# Patient Record
Sex: MALE | Race: WHITE | Employment: FULL TIME | URBAN - METROPOLITAN AREA
[De-identification: names, ages, dates, MRNs, and addresses within clinical notes are randomized per-mention and may not be internally consistent; named-entity substitution may affect disease eponyms.]

---

## 2017-05-04 RX ORDER — FENOFIBRATE 145 MG/1
TABLET, COATED ORAL
Qty: 90 TABLET | Refills: 0 | Status: SHIPPED | OUTPATIENT
Start: 2017-05-04 | End: 2017-07-31

## 2017-05-04 RX ORDER — TRIAMTERENE AND HYDROCHLOROTHIAZIDE 37.5; 25 MG/1; MG/1
CAPSULE ORAL
Qty: 90 CAPSULE | Refills: 0 | Status: SHIPPED | OUTPATIENT
Start: 2017-05-04 | End: 2017-07-31

## 2017-05-04 RX ORDER — AMLODIPINE BESYLATE 5 MG/1
TABLET ORAL
Qty: 90 TABLET | Refills: 0 | Status: SHIPPED | OUTPATIENT
Start: 2017-05-04 | End: 2017-07-31

## 2017-05-04 RX ORDER — SIMVASTATIN 20 MG
TABLET ORAL
Qty: 90 TABLET | Refills: 0 | Status: SHIPPED | OUTPATIENT
Start: 2017-05-04 | End: 2017-07-31

## 2017-08-02 RX ORDER — AMLODIPINE BESYLATE 5 MG/1
TABLET ORAL
Qty: 90 TABLET | Refills: 0 | Status: SHIPPED | OUTPATIENT
Start: 2017-08-02 | End: 2018-01-20

## 2017-08-02 RX ORDER — FENOFIBRATE 145 MG/1
TABLET, COATED ORAL
Qty: 90 TABLET | Refills: 0 | Status: SHIPPED | OUTPATIENT
Start: 2017-08-02 | End: 2018-01-20

## 2017-08-02 RX ORDER — SIMVASTATIN 20 MG
TABLET ORAL
Qty: 90 TABLET | Refills: 0 | Status: SHIPPED | OUTPATIENT
Start: 2017-08-02 | End: 2018-01-20

## 2017-08-02 RX ORDER — TRIAMTERENE AND HYDROCHLOROTHIAZIDE 37.5; 25 MG/1; MG/1
CAPSULE ORAL
Qty: 90 CAPSULE | Refills: 0 | Status: SHIPPED | OUTPATIENT
Start: 2017-08-02 | End: 2018-01-20

## 2017-08-02 NOTE — TELEPHONE ENCOUNTER
Gudelia-JACKI calling to check status of refills     [facility] Setting states Pt is out of medication.

## 2018-01-05 NOTE — LETTER
April 30, 2019     Nicola Grover  [address]  [place] [address]      Dear Jose Borja:    Below are the results from your recent visit:    Resulted Orders   COMP METABOLIC PANEL (14)   Result Value Ref Range    Glucose 95 70 - 99 mg/dL    Sodium Dr. Gongora  It looks like Tonia says the paperwork for this was on your desk yesterday.  Just wondering if this has been sent?   Lymphocyte Absolute 1.54 1.00 - 4.00 x10(3) uL    Monocyte Absolute 0.57 0.10 - 1.00 x10(3) uL    Eosinophil Absolute 0.11 0.00 - 0.70 x10(3) uL    Basophil Absolute 0.04 0.00 - 0.20 x10(3) uL    Immature Granulocyte Absolute 0.02 0.00 - 1.00 x10(3) uL

## 2018-01-19 NOTE — TELEPHONE ENCOUNTER
Pt calling to request refill for the following medication. Pt out of medication pt leaving out of town on sunday      Current Outpatient Prescriptions:  SIMVASTATIN 20 MG Oral Tab TAKE 1 TABLET BY MOUTH NIGHTLY.  Disp: 90 tablet Rfl: 0   FENOFIBRATE 145 MG

## 2018-01-20 NOTE — TELEPHONE ENCOUNTER
Failed protocol no recent labs.      Cholesterol Medications  Protocol Criteria:  · Appointment scheduled in the past 12 months or in the next 3 months  · ALT & LDL on file in the past 12 months  · ALT result < 80  · LDL result <130   Recent Outpatient Visi

## 2018-01-21 RX ORDER — FENOFIBRATE 145 MG/1
145 TABLET, COATED ORAL
Qty: 90 TABLET | Refills: 1 | Status: SHIPPED | OUTPATIENT
Start: 2018-01-21 | End: 2018-08-06

## 2018-01-21 RX ORDER — AMLODIPINE BESYLATE 5 MG/1
5 TABLET ORAL
Qty: 90 TABLET | Refills: 1 | Status: SHIPPED | OUTPATIENT
Start: 2018-01-21 | End: 2018-08-06

## 2018-01-21 RX ORDER — SIMVASTATIN 20 MG
TABLET ORAL
Qty: 90 TABLET | Refills: 1 | Status: SHIPPED | OUTPATIENT
Start: 2018-01-21 | End: 2018-08-06

## 2018-01-21 RX ORDER — TRIAMTERENE AND HYDROCHLOROTHIAZIDE 37.5; 25 MG/1; MG/1
CAPSULE ORAL
Qty: 90 CAPSULE | Refills: 1 | Status: SHIPPED | OUTPATIENT
Start: 2018-01-21 | End: 2018-08-06

## 2018-08-06 RX ORDER — TRIAMTERENE AND HYDROCHLOROTHIAZIDE 37.5; 25 MG/1; MG/1
CAPSULE ORAL
Qty: 30 CAPSULE | Refills: 1 | Status: SHIPPED | OUTPATIENT
Start: 2018-08-06 | End: 2018-11-23

## 2018-08-06 RX ORDER — SIMVASTATIN 20 MG
TABLET ORAL
Qty: 30 TABLET | Refills: 1 | Status: SHIPPED | OUTPATIENT
Start: 2018-08-06 | End: 2018-11-23

## 2018-08-06 RX ORDER — AMLODIPINE BESYLATE 5 MG/1
5 TABLET ORAL
Qty: 30 TABLET | Refills: 1 | Status: SHIPPED | OUTPATIENT
Start: 2018-08-06 | End: 2018-11-23

## 2018-08-06 RX ORDER — FENOFIBRATE 145 MG/1
145 TABLET, COATED ORAL
Qty: 30 TABLET | Refills: 1 | Status: SHIPPED | OUTPATIENT
Start: 2018-08-06 | End: 2018-11-23

## 2018-08-06 NOTE — TELEPHONE ENCOUNTER
Please call the patient. We are not giving him the requested amount. He has not been here in 2 years. I am only going to give him enough for 1 month with one additional refill. He must get in to see me before he is due for more refills after that.

## 2018-08-06 NOTE — TELEPHONE ENCOUNTER
Protocol failed due to [address] 2 years ago, labs from 2015. Please advise.     Please reply to pool: EM RN TRIAGE    Hypertensive Medications  Protocol Criteria:  · Appointment scheduled in the past 6 months or in the next 3 months  · BMP or CMP in the pas

## 2018-08-09 NOTE — TELEPHONE ENCOUNTER
Left message for patient to call office back, please transfer call to ext. [phone] when patient calls back.

## 2018-08-13 NOTE — TELEPHONE ENCOUNTER
Left message for patient to call office back, please transfer call to ext. [phone] when patient calls back.

## 2018-08-14 NOTE — TELEPHONE ENCOUNTER
CSS=-please call patient and assists with OV for med refill. Will also mail no phone response today. No future appointments.

## 2018-08-15 NOTE — TELEPHONE ENCOUNTER
Unable to leave message for patient voicemail full,several attempts to reach patient by phone, No response letter mailed to patients home address listed in EMR. Also informed pharmacy that no additional refill till patient is seen in office.

## 2018-09-06 NOTE — TELEPHONE ENCOUNTER
Requesting Amlodipine and Simvastatin refills, noted on 8/6/18 refills pt needs to schedule appt for further refills. LMTCB, please assist pt in scheduling appt when he returns the call.

## 2018-09-11 RX ORDER — SIMVASTATIN 20 MG
TABLET ORAL
Qty: 30 TABLET | Refills: 1 | OUTPATIENT
Start: 2018-09-11

## 2018-09-11 RX ORDER — AMLODIPINE BESYLATE 5 MG/1
5 TABLET ORAL
Qty: 30 TABLET | Refills: 1 | OUTPATIENT
Start: 2018-09-11

## 2018-11-25 RX ORDER — TRIAMTERENE AND HYDROCHLOROTHIAZIDE 37.5; 25 MG/1; MG/1
CAPSULE ORAL
Qty: 90 CAPSULE | Refills: 0 | Status: SHIPPED | OUTPATIENT
Start: 2018-11-25 | End: 2019-03-24

## 2018-11-25 RX ORDER — FENOFIBRATE 145 MG/1
145 TABLET, COATED ORAL
Qty: 90 TABLET | Refills: 0 | Status: SHIPPED | OUTPATIENT
Start: 2018-11-25 | End: 2019-03-24

## 2018-11-25 RX ORDER — AMLODIPINE BESYLATE 5 MG/1
5 TABLET ORAL
Qty: 90 TABLET | Refills: 0 | Status: SHIPPED | OUTPATIENT
Start: 2018-11-25 | End: 2019-03-24

## 2018-11-25 RX ORDER — SIMVASTATIN 20 MG
TABLET ORAL
Qty: 90 TABLET | Refills: 0 | Status: SHIPPED | OUTPATIENT
Start: 2018-11-25 | End: 2019-03-24

## 2019-03-26 RX ORDER — FENOFIBRATE 145 MG/1
145 TABLET, COATED ORAL
Qty: 20 TABLET | Refills: 0 | Status: SHIPPED | OUTPATIENT
Start: 2019-03-26 | End: 2019-04-25

## 2019-03-26 RX ORDER — SIMVASTATIN 20 MG
TABLET ORAL
Qty: 20 TABLET | Refills: 0 | Status: SHIPPED | OUTPATIENT
Start: 2019-03-26 | End: 2019-04-25

## 2019-03-26 RX ORDER — TRIAMTERENE AND HYDROCHLOROTHIAZIDE 37.5; 25 MG/1; MG/1
CAPSULE ORAL
Qty: 20 CAPSULE | Refills: 0 | Status: SHIPPED | OUTPATIENT
Start: 2019-03-26 | End: 2019-04-25

## 2019-03-26 RX ORDER — AMLODIPINE BESYLATE 5 MG/1
TABLET ORAL
Qty: 20 TABLET | Refills: 0 | Status: SHIPPED | OUTPATIENT
Start: 2019-03-26 | End: 2019-04-25

## 2019-03-26 NOTE — TELEPHONE ENCOUNTER
Pt is long overdue for refills. He has been advised in past of need for follow up. I have given a refill for a greatly reduced number of pills. No further refills until appt made.

## 2019-04-25 ENCOUNTER — OFFICE VISIT (OUTPATIENT)
Dept: INTERNAL MEDICINE CLINIC | Facility: CLINIC | Age: 54
End: 2019-04-25
Payer: COMMERCIAL

## 2019-04-25 VITALS
HEART RATE: 80 BPM | DIASTOLIC BLOOD PRESSURE: 98 MMHG | WEIGHT: 282.63 LBS | TEMPERATURE: 98 F | RESPIRATION RATE: 20 BRPM | SYSTOLIC BLOOD PRESSURE: 164 MMHG | BODY MASS INDEX: 44.36 KG/M2 | HEIGHT: 67 IN

## 2019-04-25 DIAGNOSIS — E78.5 HYPERLIPIDEMIA, UNSPECIFIED HYPERLIPIDEMIA TYPE: ICD-10-CM

## 2019-04-25 DIAGNOSIS — Z12.5 SCREENING FOR PROSTATE CANCER: ICD-10-CM

## 2019-04-25 DIAGNOSIS — I10 ESSENTIAL HYPERTENSION: Primary | ICD-10-CM

## 2019-04-25 DIAGNOSIS — E66.01 CLASS 3 SEVERE OBESITY WITH BODY MASS INDEX (BMI) OF 40.0 TO 44.9 IN ADULT, UNSPECIFIED OBESITY TYPE, UNSPECIFIED WHETHER SERIOUS COMORBIDITY PRESENT (HCC): ICD-10-CM

## 2019-04-25 DIAGNOSIS — Z12.11 COLON CANCER SCREENING: ICD-10-CM

## 2019-04-25 PROCEDURE — 99212 OFFICE O/P EST SF 10 MIN: CPT | Performed by: INTERNAL MEDICINE

## 2019-04-25 PROCEDURE — 99214 OFFICE O/P EST MOD 30 MIN: CPT | Performed by: INTERNAL MEDICINE

## 2019-04-25 RX ORDER — FENOFIBRATE 145 MG/1
145 TABLET, COATED ORAL DAILY
Qty: 90 TABLET | Refills: 1 | Status: SHIPPED | OUTPATIENT
Start: 2019-04-25 | End: 2019-10-29

## 2019-04-25 RX ORDER — TRIAMTERENE AND HYDROCHLOROTHIAZIDE 37.5; 25 MG/1; MG/1
CAPSULE ORAL
Qty: 90 CAPSULE | Refills: 1 | Status: SHIPPED | OUTPATIENT
Start: 2019-04-25 | End: 2019-10-29

## 2019-04-25 RX ORDER — AMLODIPINE BESYLATE 5 MG/1
5 TABLET ORAL
Qty: 90 TABLET | Refills: 1 | Status: SHIPPED | OUTPATIENT
Start: 2019-04-25 | End: 2019-10-29

## 2019-04-25 RX ORDER — SIMVASTATIN 20 MG
TABLET ORAL
Qty: 90 TABLET | Refills: 1 | Status: SHIPPED | OUTPATIENT
Start: 2019-04-25 | End: 2019-10-29

## 2019-04-25 NOTE — PROGRESS NOTES
HPI:    Patient ID: Teresa Nieto is a 48year old male. HPI  Patient is here for follow-up on chronic medical issues as listed below. I last saw him in the office in August 2016. Has not seen any other doctor since that time.   Getting refills th for rash. Neurological: Negative for weakness, numbness and headaches. Hematological: Does not bruise/bleed easily. Psychiatric/Behavioral: Negative for depressed mood. The patient is not nervous/anxious.              Current Outpatient Medications: Advised to follow-up in 2 to 3 months to recheck blood pressure. Work on diet and exercise. Check blood work and contact patient with results. Lab work last done for 5 years ago. - CBC WITH DIFFERENTIAL WITH PLATELET;  Future  - COMP METABOLIC PANEL (14

## 2019-04-26 ENCOUNTER — LAB ENCOUNTER (OUTPATIENT)
Dept: LAB | Facility: HOSPITAL | Age: 54
End: 2019-04-26
Attending: INTERNAL MEDICINE
Payer: COMMERCIAL

## 2019-04-26 DIAGNOSIS — Z12.5 SCREENING FOR PROSTATE CANCER: ICD-10-CM

## 2019-04-26 DIAGNOSIS — I10 ESSENTIAL HYPERTENSION: ICD-10-CM

## 2019-04-26 PROCEDURE — 84443 ASSAY THYROID STIM HORMONE: CPT

## 2019-04-26 PROCEDURE — 80061 LIPID PANEL: CPT

## 2019-04-26 PROCEDURE — 80053 COMPREHEN METABOLIC PANEL: CPT

## 2019-04-26 PROCEDURE — 36415 COLL VENOUS BLD VENIPUNCTURE: CPT

## 2019-04-26 PROCEDURE — 83036 HEMOGLOBIN GLYCOSYLATED A1C: CPT

## 2019-04-26 PROCEDURE — 85025 COMPLETE CBC W/AUTO DIFF WBC: CPT

## 2019-10-31 RX ORDER — TRIAMTERENE AND HYDROCHLOROTHIAZIDE 37.5; 25 MG/1; MG/1
CAPSULE ORAL
Qty: 90 CAPSULE | Refills: 0 | Status: SHIPPED | OUTPATIENT
Start: 2019-10-31 | End: 2020-01-31

## 2019-10-31 RX ORDER — AMLODIPINE BESYLATE 5 MG/1
TABLET ORAL
Qty: 90 TABLET | Refills: 0 | Status: SHIPPED | OUTPATIENT
Start: 2019-10-31 | End: 2020-01-31

## 2019-11-01 RX ORDER — SIMVASTATIN 20 MG
TABLET ORAL
Qty: 90 TABLET | Refills: 1 | Status: SHIPPED | OUTPATIENT
Start: 2019-11-01 | End: 2020-05-31

## 2019-11-01 RX ORDER — FENOFIBRATE 145 MG/1
TABLET, COATED ORAL
Qty: 90 TABLET | Refills: 1 | Status: SHIPPED | OUTPATIENT
Start: 2019-11-01 | End: 2020-05-31

## 2019-11-01 NOTE — TELEPHONE ENCOUNTER
Please review, protocol failed. Due for 6 month follow up appointment. Three must refill provided and will need appt before next refill is due.      Requested Prescriptions     Pending Prescriptions Disp Refills   • AMLODIPINE BESYLATE 5 MG Oral Tab [Pha 04/26/2019    GFRAA 114 04/26/2019    CA 9.3 04/26/2019    ALKPHOS 53 01/29/2015    AST 47 (H) 04/26/2019    ALT 88 (H) 04/26/2019    BILT 0.4 04/26/2019    TP 7.2 04/26/2019    ALB 3.9 04/26/2019     04/26/2019    K 3.9 04/26/2019     04/26/20

## 2019-11-01 NOTE — TELEPHONE ENCOUNTER
Please review, protocol failed. Last refills 4/25/19 for both fenofibrate 145mg daily and simvastatin 20 mg daily. Last office visit 4/25/19  Needs new lipid panel.     Requested Prescriptions     Pending Prescriptions Disp Refills   • FENOFIBRATE 145 MG  (H) 04/26/2019     Lab Results   Component Value Date    ALT 88 (H) 04/26/2019

## 2019-11-01 NOTE — TELEPHONE ENCOUNTER
Please review, protocol failed. Due for follow up appointment with MD.  Three months refill provided and will require appt with MD before next refill is due.     Requested Prescriptions     Pending Prescriptions Disp Refills   • Triamterene-HCTZ 37.5-25 MG CREATSERUM 0.86 04/26/2019    BUNCREA 24.4 (H) 04/26/2019    GFRNAA 99 04/26/2019    GFRAA 114 04/26/2019    CA 9.3 04/26/2019    ALKPHOS 53 01/29/2015    AST 47 (H) 04/26/2019    ALT 88 (H) 04/26/2019    BILT 0.4 04/26/2019    TP 7.2 04/26/2019    ALB 3.9

## 2020-01-31 RX ORDER — AMLODIPINE BESYLATE 5 MG/1
TABLET ORAL
Qty: 90 TABLET | Refills: 0 | Status: SHIPPED | OUTPATIENT
Start: 2020-01-31 | End: 2020-07-06

## 2020-01-31 RX ORDER — TRIAMTERENE AND HYDROCHLOROTHIAZIDE 37.5; 25 MG/1; MG/1
CAPSULE ORAL
Qty: 90 CAPSULE | Refills: 0 | Status: SHIPPED | OUTPATIENT
Start: 2020-01-31 | End: 2020-05-11

## 2020-05-11 RX ORDER — TRIAMTERENE AND HYDROCHLOROTHIAZIDE 37.5; 25 MG/1; MG/1
1 CAPSULE ORAL EVERY MORNING
Qty: 90 CAPSULE | Refills: 0 | Status: SHIPPED | OUTPATIENT
Start: 2020-05-11 | End: 2020-08-09

## 2020-05-11 NOTE — TELEPHONE ENCOUNTER
This is being sent to you without review by the Triage staff due to the high call volumes created by the COVID-19 virus, per the email sent by Dr. Nelson Or on 3/19/20. Thank you for your support.     Centralized Nurse Triage Team

## 2020-05-11 NOTE — TELEPHONE ENCOUNTER
•  TRIAMTERENE-HCTZ 37.5-25 MG Oral Cap, TAKE 1 CAPSULE BY MOUTH EVERY DAY IN THE MORNING, Disp: 90 capsule, Rfl: 0

## 2020-05-31 RX ORDER — SIMVASTATIN 20 MG
TABLET ORAL
Qty: 90 TABLET | Refills: 1 | Status: SHIPPED | OUTPATIENT
Start: 2020-05-31 | End: 2020-12-20

## 2020-05-31 RX ORDER — FENOFIBRATE 145 MG/1
TABLET, COATED ORAL
Qty: 90 TABLET | Refills: 1 | Status: SHIPPED | OUTPATIENT
Start: 2020-05-31 | End: 2020-10-20

## 2020-07-06 RX ORDER — AMLODIPINE BESYLATE 5 MG/1
TABLET ORAL
Qty: 90 TABLET | Refills: 0 | Status: SHIPPED | OUTPATIENT
Start: 2020-07-06 | End: 2020-10-03

## 2020-08-09 RX ORDER — TRIAMTERENE AND HYDROCHLOROTHIAZIDE 37.5; 25 MG/1; MG/1
CAPSULE ORAL
Qty: 90 CAPSULE | Refills: 0 | Status: SHIPPED | OUTPATIENT
Start: 2020-08-09 | End: 2020-11-09

## 2020-09-03 ENCOUNTER — TELEPHONE (OUTPATIENT)
Dept: INTERNAL MEDICINE CLINIC | Facility: CLINIC | Age: 55
End: 2020-09-03

## 2020-09-03 NOTE — TELEPHONE ENCOUNTER
LMTCB. Please assist patient on scheduling appt with Dr. Guillermo Irvin.  Pt is due for Annual physical.

## 2020-10-03 RX ORDER — AMLODIPINE BESYLATE 5 MG/1
TABLET ORAL
Qty: 90 TABLET | Refills: 0 | Status: SHIPPED | OUTPATIENT
Start: 2020-10-03 | End: 2021-01-13

## 2020-10-03 NOTE — TELEPHONE ENCOUNTER
Please schedule a phone visit, video visit or in person visit.     AURA Jennings with Dr. Mayi Westfall

## 2020-10-20 ENCOUNTER — VIRTUAL PHONE E/M (OUTPATIENT)
Dept: INTERNAL MEDICINE CLINIC | Facility: CLINIC | Age: 55
End: 2020-10-20
Payer: COMMERCIAL

## 2020-10-20 DIAGNOSIS — Z00.00 ANNUAL PHYSICAL EXAM: ICD-10-CM

## 2020-10-20 DIAGNOSIS — I10 ESSENTIAL HYPERTENSION: ICD-10-CM

## 2020-10-20 DIAGNOSIS — E78.5 HYPERLIPIDEMIA, UNSPECIFIED HYPERLIPIDEMIA TYPE: ICD-10-CM

## 2020-10-20 DIAGNOSIS — Z12.11 ENCOUNTER FOR SCREENING COLONOSCOPY: Primary | ICD-10-CM

## 2020-10-20 PROCEDURE — 99213 OFFICE O/P EST LOW 20 MIN: CPT | Performed by: NURSE PRACTITIONER

## 2020-10-20 RX ORDER — FENOFIBRATE 145 MG/1
145 TABLET, COATED ORAL DAILY
Qty: 90 TABLET | Refills: 1 | Status: SHIPPED | OUTPATIENT
Start: 2020-10-20 | End: 2021-07-12

## 2020-10-20 NOTE — ASSESSMENT & PLAN NOTE
A/P patient with a history of hyperlipidemia last readings were pretty good and listed below. These were reviewed with the patient.     Cholesterol, Total   <200 mg/dL 185    Comment:    Desirable  <200 mg/dL   Borderline  200-239 mg/dL   High      >=240 m

## 2020-10-20 NOTE — ASSESSMENT & PLAN NOTE
A/P 42-year-old male who has a history of hypertension and is on amlodipine 5 mg daily. He also takes tramadol during hydrochlorothiazide 1 Every day.     Plan  1) Follow a low-salt diet  2) Annual lab  3) May want to get a blood pressure machine at home t

## 2020-10-20 NOTE — PROGRESS NOTES
HPI:    Patient ID: Chente Buckley is a 54year old male. Please note that the following visit was completed using two-way, real-time interactive audio and/or video communication.   This has been done in good [place] to provide continuity of care in the daily.    Hyperlipidemia  A/P patient with a history of hyperlipidemia last readings were pretty good and listed below. These were reviewed with the patient.     Cholesterol, Total   <200 mg/dL 185    Comment:    Desirable  <200 mg/dL   Borderline  200-239 Concerns:        Caffeine Concern: No         Review of Systems   Constitutional: Negative for chills, fatigue and fever. HENT: Negative for ear pain, hearing loss, sinus pressure and sore throat. Eyes: Negative for visual disturbance.    Respiratory: This Visit        Cardiovascular    Essential hypertension     A/P 54-year-old male who has a history of hypertension and is on amlodipine 5 mg daily. He also takes tramadol during hydrochlorothiazide 1 Every day.     Plan  1) Follow a low-salt diet  2) An Visit:  Requested Prescriptions     Signed Prescriptions Disp Refills   • Fenofibrate 145 MG Oral Tab 90 tablet 1     Sig: Take 1 tablet (145 mg total) by mouth daily.        Imaging & Referrals:  EVALUATE & TREAT, GASTRO (INTERNAL)       Video visit 12 min

## 2020-11-09 RX ORDER — TRIAMTERENE AND HYDROCHLOROTHIAZIDE 37.5; 25 MG/1; MG/1
CAPSULE ORAL
Qty: 90 CAPSULE | Refills: 0 | Status: SHIPPED | OUTPATIENT
Start: 2020-11-09 | End: 2021-02-18

## 2020-11-09 NOTE — TELEPHONE ENCOUNTER
Remind patient that he needs to get his blood work done.     AURA Chairez  Working wit Dr. Chip Huston

## 2020-12-05 ENCOUNTER — NURSE TRIAGE (OUTPATIENT)
Dept: INTERNAL MEDICINE CLINIC | Facility: CLINIC | Age: 55
End: 2020-12-05

## 2020-12-05 NOTE — TELEPHONE ENCOUNTER
Action Requested: Summary for Provider     []  Critical Lab, Recommendations Needed  [] Need Additional Advice  []   FYI    []   Need Orders  [] Need Medications Sent to Pharmacy  []  Other     SUMMARY: Per protocol advised home care with mostly fluids and

## 2020-12-08 ENCOUNTER — LAB ENCOUNTER (OUTPATIENT)
Dept: LAB | Facility: HOSPITAL | Age: 55
End: 2020-12-08
Attending: NURSE PRACTITIONER
Payer: COMMERCIAL

## 2020-12-08 DIAGNOSIS — I10 ESSENTIAL HYPERTENSION: ICD-10-CM

## 2020-12-08 DIAGNOSIS — E78.5 HYPERLIPIDEMIA, UNSPECIFIED HYPERLIPIDEMIA TYPE: ICD-10-CM

## 2020-12-08 PROCEDURE — 86900 BLOOD TYPING SEROLOGIC ABO: CPT

## 2020-12-08 PROCEDURE — 86901 BLOOD TYPING SEROLOGIC RH(D): CPT

## 2020-12-08 PROCEDURE — 36415 COLL VENOUS BLD VENIPUNCTURE: CPT

## 2020-12-08 PROCEDURE — 80061 LIPID PANEL: CPT

## 2020-12-08 PROCEDURE — 80053 COMPREHEN METABOLIC PANEL: CPT

## 2020-12-08 PROCEDURE — 83036 HEMOGLOBIN GLYCOSYLATED A1C: CPT

## 2020-12-08 PROCEDURE — 82306 VITAMIN D 25 HYDROXY: CPT

## 2020-12-08 PROCEDURE — 82607 VITAMIN B-12: CPT

## 2020-12-08 PROCEDURE — 85025 COMPLETE CBC W/AUTO DIFF WBC: CPT

## 2020-12-08 PROCEDURE — 84443 ASSAY THYROID STIM HORMONE: CPT

## 2020-12-08 PROCEDURE — 84439 ASSAY OF FREE THYROXINE: CPT

## 2020-12-10 RX ORDER — ERGOCALCIFEROL 1.25 MG/1
50000 CAPSULE ORAL WEEKLY
Qty: 12 CAPSULE | Refills: 0 | Status: SHIPPED | OUTPATIENT
Start: 2020-12-10 | End: 2020-12-22

## 2020-12-20 RX ORDER — SIMVASTATIN 20 MG
TABLET ORAL
Qty: 90 TABLET | Refills: 1 | Status: ON HOLD | OUTPATIENT
Start: 2020-12-20 | End: 2021-03-22

## 2021-01-13 RX ORDER — AMLODIPINE BESYLATE 5 MG/1
TABLET ORAL
Qty: 90 TABLET | Refills: 0 | Status: SHIPPED | OUTPATIENT
Start: 2021-01-13 | End: 2021-04-12

## 2021-02-18 RX ORDER — TRIAMTERENE AND HYDROCHLOROTHIAZIDE 37.5; 25 MG/1; MG/1
CAPSULE ORAL
Qty: 90 CAPSULE | Refills: 0 | Status: ON HOLD | OUTPATIENT
Start: 2021-02-18 | End: 2021-03-22

## 2021-03-10 RX ORDER — ERGOCALCIFEROL 1.25 MG/1
50000 CAPSULE ORAL WEEKLY
Qty: 12 CAPSULE | Refills: 0 | OUTPATIENT
Start: 2021-03-10 | End: 2021-03-22

## 2021-03-19 ENCOUNTER — HOSPITAL ENCOUNTER (EMERGENCY)
Facility: HOSPITAL | Age: 56
Discharge: HOME | End: 2021-03-19
Attending: EMERGENCY MEDICINE
Payer: COMMERCIAL

## 2021-03-19 VITALS
HEART RATE: 71 BPM | SYSTOLIC BLOOD PRESSURE: 135 MMHG | DIASTOLIC BLOOD PRESSURE: 80 MMHG | RESPIRATION RATE: 20 BRPM | OXYGEN SATURATION: 93 % | TEMPERATURE: 98 F

## 2021-03-19 DIAGNOSIS — K29.00 ACUTE GASTRITIS WITHOUT HEMORRHAGE, UNSPECIFIED GASTRITIS TYPE: Primary | ICD-10-CM

## 2021-03-19 LAB
ALBUMIN SERPL-MCNC: 4.3 G/DL (ref 3.4–5)
ALP LIVER SERPL-CCNC: 82 U/L
ALT SERPL-CCNC: 71 U/L
ANION GAP SERPL CALC-SCNC: 3 MMOL/L (ref 0–18)
AST SERPL-CCNC: 42 U/L (ref 15–37)
BASOPHILS # BLD AUTO: 0.04 X10(3) UL (ref 0–0.2)
BASOPHILS NFR BLD AUTO: 0.3 %
BILIRUB DIRECT SERPL-MCNC: 0.1 MG/DL (ref 0–0.2)
BILIRUB SERPL-MCNC: 0.5 MG/DL (ref 0.1–2)
BUN BLD-MCNC: 23 MG/DL (ref 7–18)
BUN/CREAT SERPL: 24.2 (ref 10–20)
CALCIUM BLD-MCNC: 9.5 MG/DL (ref 8.5–10.1)
CHLORIDE SERPL-SCNC: 105 MMOL/L (ref 98–112)
CO2 SERPL-SCNC: 31 MMOL/L (ref 21–32)
CREAT BLD-MCNC: 0.95 MG/DL
DEPRECATED RDW RBC AUTO: 38.8 FL (ref 35.1–46.3)
EOSINOPHIL # BLD AUTO: 0.03 X10(3) UL (ref 0–0.7)
EOSINOPHIL NFR BLD AUTO: 0.2 %
ERYTHROCYTE [DISTWIDTH] IN BLOOD BY AUTOMATED COUNT: 12.4 % (ref 11–15)
GLUCOSE BLD-MCNC: 141 MG/DL (ref 70–99)
HCT VFR BLD AUTO: 44 %
HGB BLD-MCNC: 15 G/DL
IMM GRANULOCYTES # BLD AUTO: 0.04 X10(3) UL (ref 0–1)
IMM GRANULOCYTES NFR BLD: 0.3 %
LIPASE SERPL-CCNC: 141 U/L (ref 73–393)
LYMPHOCYTES # BLD AUTO: 1.18 X10(3) UL (ref 1–4)
LYMPHOCYTES NFR BLD AUTO: 9.6 %
M PROTEIN MFR SERPL ELPH: 7.8 G/DL (ref 6.4–8.2)
MCH RBC QN AUTO: 29.5 PG (ref 26–34)
MCHC RBC AUTO-ENTMCNC: 34.1 G/DL (ref 31–37)
MCV RBC AUTO: 86.6 FL
MONOCYTES # BLD AUTO: 0.63 X10(3) UL (ref 0.1–1)
MONOCYTES NFR BLD AUTO: 5.1 %
NEUTROPHILS # BLD AUTO: 10.41 X10 (3) UL (ref 1.5–7.7)
NEUTROPHILS # BLD AUTO: 10.41 X10(3) UL (ref 1.5–7.7)
NEUTROPHILS NFR BLD AUTO: 84.5 %
OSMOLALITY SERPL CALC.SUM OF ELEC: 294 MOSM/KG (ref 275–295)
PLATELET # BLD AUTO: 267 10(3)UL (ref 150–450)
POTASSIUM SERPL-SCNC: 3.8 MMOL/L (ref 3.5–5.1)
RBC # BLD AUTO: 5.08 X10(6)UL
SODIUM SERPL-SCNC: 139 MMOL/L (ref 136–145)
WBC # BLD AUTO: 12.3 X10(3) UL (ref 4–11)

## 2021-03-19 PROCEDURE — 99284 EMERGENCY DEPT VISIT MOD MDM: CPT

## 2021-03-19 PROCEDURE — 96375 TX/PRO/DX INJ NEW DRUG ADDON: CPT

## 2021-03-19 PROCEDURE — 96361 HYDRATE IV INFUSION ADD-ON: CPT

## 2021-03-19 PROCEDURE — 83690 ASSAY OF LIPASE: CPT | Performed by: EMERGENCY MEDICINE

## 2021-03-19 PROCEDURE — 96374 THER/PROPH/DIAG INJ IV PUSH: CPT

## 2021-03-19 PROCEDURE — 85025 COMPLETE CBC W/AUTO DIFF WBC: CPT | Performed by: EMERGENCY MEDICINE

## 2021-03-19 PROCEDURE — 80048 BASIC METABOLIC PNL TOTAL CA: CPT | Performed by: EMERGENCY MEDICINE

## 2021-03-19 PROCEDURE — 80076 HEPATIC FUNCTION PANEL: CPT | Performed by: EMERGENCY MEDICINE

## 2021-03-19 RX ORDER — MAGNESIUM HYDROXIDE/ALUMINUM HYDROXICE/SIMETHICONE 120; 1200; 1200 MG/30ML; MG/30ML; MG/30ML
10 SUSPENSION ORAL 4 TIMES DAILY PRN
Qty: 1 BOTTLE | Refills: 0 | Status: SHIPPED | OUTPATIENT
Start: 2021-03-19 | End: 2021-04-12 | Stop reason: ALTCHOICE

## 2021-03-19 RX ORDER — ONDANSETRON 2 MG/ML
4 INJECTION INTRAMUSCULAR; INTRAVENOUS ONCE
Status: COMPLETED | OUTPATIENT
Start: 2021-03-19 | End: 2021-03-19

## 2021-03-19 RX ORDER — METOCLOPRAMIDE HYDROCHLORIDE 5 MG/ML
10 INJECTION INTRAMUSCULAR; INTRAVENOUS ONCE
Status: COMPLETED | OUTPATIENT
Start: 2021-03-19 | End: 2021-03-19

## 2021-03-19 RX ORDER — ONDANSETRON 4 MG/1
4 TABLET, ORALLY DISINTEGRATING ORAL EVERY 4 HOURS PRN
Qty: 10 TABLET | Refills: 0 | Status: SHIPPED | OUTPATIENT
Start: 2021-03-19 | End: 2021-03-26

## 2021-03-20 ENCOUNTER — HOSPITAL ENCOUNTER (INPATIENT)
Facility: HOSPITAL | Age: 56
LOS: 1 days | Discharge: HOME | DRG: 418 | End: 2021-03-22
Attending: EMERGENCY MEDICINE | Admitting: HOSPITALIST
Payer: COMMERCIAL

## 2021-03-20 ENCOUNTER — APPOINTMENT (OUTPATIENT)
Dept: ULTRASOUND IMAGING | Facility: HOSPITAL | Age: 56
DRG: 418 | End: 2021-03-20
Attending: EMERGENCY MEDICINE
Payer: COMMERCIAL

## 2021-03-20 DIAGNOSIS — K81.0 ACUTE CHOLECYSTITIS: ICD-10-CM

## 2021-03-20 DIAGNOSIS — K81.9 CHOLECYSTITIS: Primary | ICD-10-CM

## 2021-03-20 LAB
ALBUMIN SERPL-MCNC: 3.7 G/DL (ref 3.4–5)
ALP LIVER SERPL-CCNC: 61 U/L
ALT SERPL-CCNC: 56 U/L
ANION GAP SERPL CALC-SCNC: 7 MMOL/L (ref 0–18)
AST SERPL-CCNC: 26 U/L (ref 15–37)
BASOPHILS # BLD AUTO: 0.03 X10(3) UL (ref 0–0.2)
BASOPHILS NFR BLD AUTO: 0.2 %
BILIRUB DIRECT SERPL-MCNC: 0.5 MG/DL (ref 0–0.2)
BILIRUB SERPL-MCNC: 1.3 MG/DL (ref 0.1–2)
BILIRUB UR QL: NEGATIVE
BUN BLD-MCNC: 20 MG/DL (ref 7–18)
BUN/CREAT SERPL: 20.8 (ref 10–20)
CALCIUM BLD-MCNC: 8.8 MG/DL (ref 8.5–10.1)
CHLORIDE SERPL-SCNC: 103 MMOL/L (ref 98–112)
CO2 SERPL-SCNC: 28 MMOL/L (ref 21–32)
COLOR UR: YELLOW
CREAT BLD-MCNC: 0.96 MG/DL
DEPRECATED RDW RBC AUTO: 40.5 FL (ref 35.1–46.3)
EOSINOPHIL # BLD AUTO: 0 X10(3) UL (ref 0–0.7)
EOSINOPHIL NFR BLD AUTO: 0 %
ERYTHROCYTE [DISTWIDTH] IN BLOOD BY AUTOMATED COUNT: 12.6 % (ref 11–15)
GLUCOSE BLD-MCNC: 137 MG/DL (ref 70–99)
GLUCOSE UR-MCNC: 50 MG/DL
HCT VFR BLD AUTO: 43.5 %
HGB BLD-MCNC: 14.4 G/DL
HGB UR QL STRIP.AUTO: NEGATIVE
IMM GRANULOCYTES # BLD AUTO: 0.14 X10(3) UL (ref 0–1)
IMM GRANULOCYTES NFR BLD: 0.9 %
KETONES UR-MCNC: 20 MG/DL
LEUKOCYTE ESTERASE UR QL STRIP.AUTO: NEGATIVE
LIPASE SERPL-CCNC: 69 U/L (ref 73–393)
LYMPHOCYTES # BLD AUTO: 0.66 X10(3) UL (ref 1–4)
LYMPHOCYTES NFR BLD AUTO: 4.1 %
M PROTEIN MFR SERPL ELPH: 7.5 G/DL (ref 6.4–8.2)
MCH RBC QN AUTO: 29.2 PG (ref 26–34)
MCHC RBC AUTO-ENTMCNC: 33.1 G/DL (ref 31–37)
MCV RBC AUTO: 88.2 FL
MONOCYTES # BLD AUTO: 1.42 X10(3) UL (ref 0.1–1)
MONOCYTES NFR BLD AUTO: 8.8 %
NEUTROPHILS # BLD AUTO: 13.89 X10 (3) UL (ref 1.5–7.7)
NEUTROPHILS # BLD AUTO: 13.89 X10(3) UL (ref 1.5–7.7)
NEUTROPHILS NFR BLD AUTO: 86 %
NITRITE UR QL STRIP.AUTO: NEGATIVE
OSMOLALITY SERPL CALC.SUM OF ELEC: 291 MOSM/KG (ref 275–295)
PH UR: 5 [PH] (ref 5–8)
PLATELET # BLD AUTO: 217 10(3)UL (ref 150–450)
POTASSIUM SERPL-SCNC: 3.6 MMOL/L (ref 3.5–5.1)
PROT UR-MCNC: 30 MG/DL
RBC # BLD AUTO: 4.93 X10(6)UL
SODIUM SERPL-SCNC: 138 MMOL/L (ref 136–145)
SP GR UR STRIP: >1.03 (ref 1–1.03)
UROBILINOGEN UR STRIP-ACNC: 4
WBC # BLD AUTO: 16.1 X10(3) UL (ref 4–11)

## 2021-03-20 PROCEDURE — 76705 ECHO EXAM OF ABDOMEN: CPT | Performed by: EMERGENCY MEDICINE

## 2021-03-20 RX ORDER — KETOROLAC TROMETHAMINE 15 MG/ML
15 INJECTION, SOLUTION INTRAMUSCULAR; INTRAVENOUS ONCE
Status: COMPLETED | OUTPATIENT
Start: 2021-03-20 | End: 2021-03-20

## 2021-03-20 RX ORDER — ONDANSETRON 2 MG/ML
4 INJECTION INTRAMUSCULAR; INTRAVENOUS ONCE
Status: COMPLETED | OUTPATIENT
Start: 2021-03-20 | End: 2021-03-20

## 2021-03-20 NOTE — ED NOTES
Patient is here with nausea and 2 episodes of emesis. No blood in emesis. Patient denied diarrhea. Patient feels that he might have food poisoning because that's how he felt when he had it last time. Denied pain with palpation.

## 2021-03-21 ENCOUNTER — APPOINTMENT (OUTPATIENT)
Dept: GENERAL RADIOLOGY | Facility: HOSPITAL | Age: 56
DRG: 418 | End: 2021-03-21
Attending: HOSPITALIST
Payer: COMMERCIAL

## 2021-03-21 ENCOUNTER — ANESTHESIA (OUTPATIENT)
Dept: SURGERY | Facility: HOSPITAL | Age: 56
DRG: 418 | End: 2021-03-21
Payer: COMMERCIAL

## 2021-03-21 ENCOUNTER — ANESTHESIA EVENT (OUTPATIENT)
Dept: SURGERY | Facility: HOSPITAL | Age: 56
DRG: 418 | End: 2021-03-21
Payer: COMMERCIAL

## 2021-03-21 LAB
ALBUMIN SERPL-MCNC: 3.3 G/DL (ref 3.4–5)
ALBUMIN/GLOB SERPL: 0.9 {RATIO} (ref 1–2)
ALP LIVER SERPL-CCNC: 87 U/L
ALT SERPL-CCNC: 69 U/L
ANION GAP SERPL CALC-SCNC: 3 MMOL/L (ref 0–18)
AST SERPL-CCNC: 48 U/L (ref 15–37)
BASOPHILS # BLD AUTO: 0.03 X10(3) UL (ref 0–0.2)
BASOPHILS NFR BLD AUTO: 0.2 %
BILIRUB SERPL-MCNC: 1.5 MG/DL (ref 0.1–2)
BUN BLD-MCNC: 18 MG/DL (ref 7–18)
BUN/CREAT SERPL: 22.2 (ref 10–20)
CALCIUM BLD-MCNC: 8.6 MG/DL (ref 8.5–10.1)
CHLORIDE SERPL-SCNC: 106 MMOL/L (ref 98–112)
CO2 SERPL-SCNC: 31 MMOL/L (ref 21–32)
CREAT BLD-MCNC: 0.81 MG/DL
DEPRECATED RDW RBC AUTO: 41.1 FL (ref 35.1–46.3)
EOSINOPHIL # BLD AUTO: 0 X10(3) UL (ref 0–0.7)
EOSINOPHIL NFR BLD AUTO: 0 %
ERYTHROCYTE [DISTWIDTH] IN BLOOD BY AUTOMATED COUNT: 12.6 % (ref 11–15)
EST. AVERAGE GLUCOSE BLD GHB EST-MCNC: 123 MG/DL (ref 68–126)
GLOBULIN PLAS-MCNC: 3.5 G/DL (ref 2.8–4.4)
GLUCOSE BLD-MCNC: 116 MG/DL (ref 70–99)
GLUCOSE BLDC GLUCOMTR-MCNC: 138 MG/DL (ref 70–99)
HAV IGM SER QL: 2.1 MG/DL (ref 1.6–2.6)
HBA1C MFR BLD HPLC: 5.9 % (ref ?–5.7)
HCT VFR BLD AUTO: 40.9 %
HGB BLD-MCNC: 13.2 G/DL
IMM GRANULOCYTES # BLD AUTO: 0.27 X10(3) UL (ref 0–1)
IMM GRANULOCYTES NFR BLD: 1.7 %
LYMPHOCYTES # BLD AUTO: 0.68 X10(3) UL (ref 1–4)
LYMPHOCYTES NFR BLD AUTO: 4.2 %
M PROTEIN MFR SERPL ELPH: 6.8 G/DL (ref 6.4–8.2)
MCH RBC QN AUTO: 28.7 PG (ref 26–34)
MCHC RBC AUTO-ENTMCNC: 32.3 G/DL (ref 31–37)
MCV RBC AUTO: 88.9 FL
MONOCYTES # BLD AUTO: 1.38 X10(3) UL (ref 0.1–1)
MONOCYTES NFR BLD AUTO: 8.5 %
NEUTROPHILS # BLD AUTO: 13.83 X10 (3) UL (ref 1.5–7.7)
NEUTROPHILS # BLD AUTO: 13.83 X10(3) UL (ref 1.5–7.7)
NEUTROPHILS NFR BLD AUTO: 85.4 %
OSMOLALITY SERPL CALC.SUM OF ELEC: 293 MOSM/KG (ref 275–295)
PHOSPHATE SERPL-MCNC: 3 MG/DL (ref 2.5–4.9)
PLATELET # BLD AUTO: 187 10(3)UL (ref 150–450)
POTASSIUM SERPL-SCNC: 3.6 MMOL/L (ref 3.5–5.1)
RBC # BLD AUTO: 4.6 X10(6)UL
SARS-COV-2 RNA RESP QL NAA+PROBE: NOT DETECTED
SODIUM SERPL-SCNC: 140 MMOL/L (ref 136–145)
WBC # BLD AUTO: 16.2 X10(3) UL (ref 4–11)

## 2021-03-21 PROCEDURE — 47562 LAPAROSCOPIC CHOLECYSTECTOMY: CPT | Performed by: SURGERY

## 2021-03-21 PROCEDURE — 0FT44ZZ RESECTION OF GALLBLADDER, PERCUTANEOUS ENDOSCOPIC APPROACH: ICD-10-PCS | Performed by: SURGERY

## 2021-03-21 PROCEDURE — 71045 X-RAY EXAM CHEST 1 VIEW: CPT | Performed by: HOSPITALIST

## 2021-03-21 PROCEDURE — 99254 IP/OBS CNSLTJ NEW/EST MOD 60: CPT | Performed by: SURGERY

## 2021-03-21 PROCEDURE — 99222 1ST HOSP IP/OBS MODERATE 55: CPT | Performed by: HOSPITALIST

## 2021-03-21 RX ORDER — HALOPERIDOL 5 MG/ML
0.25 INJECTION INTRAMUSCULAR ONCE AS NEEDED
Status: DISCONTINUED | OUTPATIENT
Start: 2021-03-21 | End: 2021-03-21 | Stop reason: HOSPADM

## 2021-03-21 RX ORDER — SODIUM PHOSPHATE, DIBASIC AND SODIUM PHOSPHATE, MONOBASIC 7; 19 G/133ML; G/133ML
1 ENEMA RECTAL ONCE AS NEEDED
Status: DISCONTINUED | OUTPATIENT
Start: 2021-03-21 | End: 2021-03-22

## 2021-03-21 RX ORDER — SODIUM CHLORIDE, SODIUM LACTATE, POTASSIUM CHLORIDE, CALCIUM CHLORIDE 600; 310; 30; 20 MG/100ML; MG/100ML; MG/100ML; MG/100ML
INJECTION, SOLUTION INTRAVENOUS CONTINUOUS
Status: DISCONTINUED | OUTPATIENT
Start: 2021-03-21 | End: 2021-03-21 | Stop reason: HOSPADM

## 2021-03-21 RX ORDER — ONDANSETRON 2 MG/ML
INJECTION INTRAMUSCULAR; INTRAVENOUS AS NEEDED
Status: DISCONTINUED | OUTPATIENT
Start: 2021-03-21 | End: 2021-03-21 | Stop reason: SURG

## 2021-03-21 RX ORDER — SODIUM CHLORIDE 9 MG/ML
INJECTION, SOLUTION INTRAVENOUS CONTINUOUS
Status: DISCONTINUED | OUTPATIENT
Start: 2021-03-21 | End: 2021-03-22

## 2021-03-21 RX ORDER — MORPHINE SULFATE 10 MG/ML
6 INJECTION, SOLUTION INTRAMUSCULAR; INTRAVENOUS EVERY 10 MIN PRN
Status: DISCONTINUED | OUTPATIENT
Start: 2021-03-21 | End: 2021-03-21 | Stop reason: HOSPADM

## 2021-03-21 RX ORDER — HEPARIN SODIUM 5000 [USP'U]/ML
5000 INJECTION, SOLUTION INTRAVENOUS; SUBCUTANEOUS ONCE
Status: COMPLETED | OUTPATIENT
Start: 2021-03-21 | End: 2021-03-21

## 2021-03-21 RX ORDER — KETOROLAC TROMETHAMINE 30 MG/ML
30 INJECTION, SOLUTION INTRAMUSCULAR; INTRAVENOUS EVERY 6 HOURS
Status: DISCONTINUED | OUTPATIENT
Start: 2021-03-21 | End: 2021-03-21

## 2021-03-21 RX ORDER — ONDANSETRON 2 MG/ML
4 INJECTION INTRAMUSCULAR; INTRAVENOUS EVERY 6 HOURS PRN
Status: DISCONTINUED | OUTPATIENT
Start: 2021-03-21 | End: 2021-03-21

## 2021-03-21 RX ORDER — HYDROCODONE BITARTRATE AND ACETAMINOPHEN 5; 325 MG/1; MG/1
2 TABLET ORAL EVERY 4 HOURS PRN
Status: DISCONTINUED | OUTPATIENT
Start: 2021-03-21 | End: 2021-03-22

## 2021-03-21 RX ORDER — MORPHINE SULFATE 4 MG/ML
2 INJECTION, SOLUTION INTRAMUSCULAR; INTRAVENOUS EVERY 10 MIN PRN
Status: DISCONTINUED | OUTPATIENT
Start: 2021-03-21 | End: 2021-03-21 | Stop reason: HOSPADM

## 2021-03-21 RX ORDER — POLYETHYLENE GLYCOL 3350 17 G/17G
17 POWDER, FOR SOLUTION ORAL DAILY PRN
Status: DISCONTINUED | OUTPATIENT
Start: 2021-03-21 | End: 2021-03-22

## 2021-03-21 RX ORDER — KETOROLAC TROMETHAMINE 30 MG/ML
30 INJECTION, SOLUTION INTRAMUSCULAR; INTRAVENOUS EVERY 6 HOURS
Status: DISCONTINUED | OUTPATIENT
Start: 2021-03-21 | End: 2021-03-22

## 2021-03-21 RX ORDER — ROCURONIUM BROMIDE 10 MG/ML
INJECTION, SOLUTION INTRAVENOUS AS NEEDED
Status: DISCONTINUED | OUTPATIENT
Start: 2021-03-21 | End: 2021-03-21 | Stop reason: SURG

## 2021-03-21 RX ORDER — BISACODYL 10 MG
10 SUPPOSITORY, RECTAL RECTAL
Status: DISCONTINUED | OUTPATIENT
Start: 2021-03-21 | End: 2021-03-22

## 2021-03-21 RX ORDER — MORPHINE SULFATE 4 MG/ML
4 INJECTION, SOLUTION INTRAMUSCULAR; INTRAVENOUS EVERY 2 HOUR PRN
Status: DISCONTINUED | OUTPATIENT
Start: 2021-03-21 | End: 2021-03-22

## 2021-03-21 RX ORDER — HYDROMORPHONE HYDROCHLORIDE 1 MG/ML
0.3 INJECTION, SOLUTION INTRAMUSCULAR; INTRAVENOUS; SUBCUTANEOUS
Status: DISCONTINUED | OUTPATIENT
Start: 2021-03-21 | End: 2021-03-22

## 2021-03-21 RX ORDER — MORPHINE SULFATE 2 MG/ML
2 INJECTION, SOLUTION INTRAMUSCULAR; INTRAVENOUS EVERY 4 HOURS PRN
Status: DISCONTINUED | OUTPATIENT
Start: 2021-03-21 | End: 2021-03-21

## 2021-03-21 RX ORDER — HYDROMORPHONE HYDROCHLORIDE 1 MG/ML
0.5 INJECTION, SOLUTION INTRAMUSCULAR; INTRAVENOUS; SUBCUTANEOUS
Status: DISCONTINUED | OUTPATIENT
Start: 2021-03-21 | End: 2021-03-22

## 2021-03-21 RX ORDER — MORPHINE SULFATE 2 MG/ML
2 INJECTION, SOLUTION INTRAMUSCULAR; INTRAVENOUS EVERY 2 HOUR PRN
Status: DISCONTINUED | OUTPATIENT
Start: 2021-03-21 | End: 2021-03-22

## 2021-03-21 RX ORDER — IPRATROPIUM BROMIDE AND ALBUTEROL SULFATE 2.5; .5 MG/3ML; MG/3ML
3 SOLUTION RESPIRATORY (INHALATION) ONCE
Status: COMPLETED | OUTPATIENT
Start: 2021-03-21 | End: 2021-03-21

## 2021-03-21 RX ORDER — MORPHINE SULFATE 4 MG/ML
8 INJECTION, SOLUTION INTRAMUSCULAR; INTRAVENOUS EVERY 2 HOUR PRN
Status: DISCONTINUED | OUTPATIENT
Start: 2021-03-21 | End: 2021-03-22

## 2021-03-21 RX ORDER — LABETALOL HYDROCHLORIDE 5 MG/ML
INJECTION, SOLUTION INTRAVENOUS AS NEEDED
Status: DISCONTINUED | OUTPATIENT
Start: 2021-03-21 | End: 2021-03-21 | Stop reason: SURG

## 2021-03-21 RX ORDER — DOCUSATE SODIUM 100 MG/1
100 CAPSULE, LIQUID FILLED ORAL 2 TIMES DAILY
Status: DISCONTINUED | OUTPATIENT
Start: 2021-03-21 | End: 2021-03-22

## 2021-03-21 RX ORDER — HYDROCODONE BITARTRATE AND ACETAMINOPHEN 5; 325 MG/1; MG/1
2 TABLET ORAL AS NEEDED
Status: DISCONTINUED | OUTPATIENT
Start: 2021-03-21 | End: 2021-03-21 | Stop reason: HOSPADM

## 2021-03-21 RX ORDER — HYDROCODONE BITARTRATE AND ACETAMINOPHEN 5; 325 MG/1; MG/1
1 TABLET ORAL AS NEEDED
Status: DISCONTINUED | OUTPATIENT
Start: 2021-03-21 | End: 2021-03-21 | Stop reason: HOSPADM

## 2021-03-21 RX ORDER — METOCLOPRAMIDE HYDROCHLORIDE 5 MG/ML
10 INJECTION INTRAMUSCULAR; INTRAVENOUS EVERY 6 HOURS PRN
Status: DISCONTINUED | OUTPATIENT
Start: 2021-03-21 | End: 2021-03-22

## 2021-03-21 RX ORDER — MORPHINE SULFATE 2 MG/ML
4 INJECTION, SOLUTION INTRAMUSCULAR; INTRAVENOUS EVERY 4 HOURS PRN
Status: DISCONTINUED | OUTPATIENT
Start: 2021-03-21 | End: 2021-03-21

## 2021-03-21 RX ORDER — MORPHINE SULFATE 4 MG/ML
INJECTION, SOLUTION INTRAMUSCULAR; INTRAVENOUS
Status: COMPLETED
Start: 2021-03-21 | End: 2021-03-21

## 2021-03-21 RX ORDER — PROCHLORPERAZINE EDISYLATE 5 MG/ML
5 INJECTION INTRAMUSCULAR; INTRAVENOUS ONCE AS NEEDED
Status: DISCONTINUED | OUTPATIENT
Start: 2021-03-21 | End: 2021-03-21 | Stop reason: HOSPADM

## 2021-03-21 RX ORDER — HYDROMORPHONE HYDROCHLORIDE 1 MG/ML
0.2 INJECTION, SOLUTION INTRAMUSCULAR; INTRAVENOUS; SUBCUTANEOUS EVERY 5 MIN PRN
Status: DISCONTINUED | OUTPATIENT
Start: 2021-03-21 | End: 2021-03-21 | Stop reason: HOSPADM

## 2021-03-21 RX ORDER — AMLODIPINE BESYLATE 5 MG/1
5 TABLET ORAL DAILY
Status: DISCONTINUED | OUTPATIENT
Start: 2021-03-21 | End: 2021-03-22

## 2021-03-21 RX ORDER — MIDAZOLAM HYDROCHLORIDE 1 MG/ML
INJECTION INTRAMUSCULAR; INTRAVENOUS AS NEEDED
Status: DISCONTINUED | OUTPATIENT
Start: 2021-03-21 | End: 2021-03-21 | Stop reason: SURG

## 2021-03-21 RX ORDER — HYDROMORPHONE HYDROCHLORIDE 1 MG/ML
0.4 INJECTION, SOLUTION INTRAMUSCULAR; INTRAVENOUS; SUBCUTANEOUS EVERY 5 MIN PRN
Status: DISCONTINUED | OUTPATIENT
Start: 2021-03-21 | End: 2021-03-21 | Stop reason: HOSPADM

## 2021-03-21 RX ORDER — HEPARIN SODIUM 5000 [USP'U]/ML
5000 INJECTION, SOLUTION INTRAVENOUS; SUBCUTANEOUS EVERY 12 HOURS SCHEDULED
Status: DISCONTINUED | OUTPATIENT
Start: 2021-03-21 | End: 2021-03-22

## 2021-03-21 RX ORDER — MORPHINE SULFATE 4 MG/ML
4 INJECTION, SOLUTION INTRAMUSCULAR; INTRAVENOUS EVERY 10 MIN PRN
Status: DISCONTINUED | OUTPATIENT
Start: 2021-03-21 | End: 2021-03-21 | Stop reason: HOSPADM

## 2021-03-21 RX ORDER — HYDROMORPHONE HYDROCHLORIDE 1 MG/ML
0.6 INJECTION, SOLUTION INTRAMUSCULAR; INTRAVENOUS; SUBCUTANEOUS EVERY 5 MIN PRN
Status: DISCONTINUED | OUTPATIENT
Start: 2021-03-21 | End: 2021-03-21 | Stop reason: HOSPADM

## 2021-03-21 RX ORDER — NALOXONE HYDROCHLORIDE 0.4 MG/ML
80 INJECTION, SOLUTION INTRAMUSCULAR; INTRAVENOUS; SUBCUTANEOUS AS NEEDED
Status: DISCONTINUED | OUTPATIENT
Start: 2021-03-21 | End: 2021-03-21 | Stop reason: HOSPADM

## 2021-03-21 RX ORDER — HEPARIN SODIUM 5000 [USP'U]/ML
INJECTION, SOLUTION INTRAVENOUS; SUBCUTANEOUS
Status: COMPLETED
Start: 2021-03-21 | End: 2021-03-21

## 2021-03-21 RX ORDER — ONDANSETRON 2 MG/ML
4 INJECTION INTRAMUSCULAR; INTRAVENOUS EVERY 6 HOURS PRN
Status: DISCONTINUED | OUTPATIENT
Start: 2021-03-21 | End: 2021-03-22

## 2021-03-21 RX ORDER — BUPIVACAINE HYDROCHLORIDE 5 MG/ML
INJECTION, SOLUTION EPIDURAL; INTRACAUDAL AS NEEDED
Status: DISCONTINUED | OUTPATIENT
Start: 2021-03-21 | End: 2021-03-21 | Stop reason: HOSPADM

## 2021-03-21 RX ORDER — ONDANSETRON 2 MG/ML
4 INJECTION INTRAMUSCULAR; INTRAVENOUS ONCE AS NEEDED
Status: DISCONTINUED | OUTPATIENT
Start: 2021-03-21 | End: 2021-03-21 | Stop reason: HOSPADM

## 2021-03-21 RX ORDER — LIDOCAINE HYDROCHLORIDE 10 MG/ML
INJECTION, SOLUTION EPIDURAL; INFILTRATION; INTRACAUDAL; PERINEURAL AS NEEDED
Status: DISCONTINUED | OUTPATIENT
Start: 2021-03-21 | End: 2021-03-21 | Stop reason: SURG

## 2021-03-21 RX ORDER — HYDROCODONE BITARTRATE AND ACETAMINOPHEN 5; 325 MG/1; MG/1
1 TABLET ORAL EVERY 4 HOURS PRN
Status: DISCONTINUED | OUTPATIENT
Start: 2021-03-21 | End: 2021-03-22

## 2021-03-21 RX ORDER — MORPHINE SULFATE 4 MG/ML
4 INJECTION, SOLUTION INTRAMUSCULAR; INTRAVENOUS ONCE
Status: COMPLETED | OUTPATIENT
Start: 2021-03-21 | End: 2021-03-21

## 2021-03-21 RX ADMIN — MIDAZOLAM HYDROCHLORIDE 2 MG: 1 INJECTION INTRAMUSCULAR; INTRAVENOUS at 13:11:00

## 2021-03-21 RX ADMIN — ROCURONIUM BROMIDE 30 MG: 10 INJECTION, SOLUTION INTRAVENOUS at 13:17:00

## 2021-03-21 RX ADMIN — LABETALOL HYDROCHLORIDE 10 MG: 5 INJECTION, SOLUTION INTRAVENOUS at 13:43:00

## 2021-03-21 RX ADMIN — SODIUM CHLORIDE: 9 INJECTION, SOLUTION INTRAVENOUS at 13:05:00

## 2021-03-21 RX ADMIN — ONDANSETRON 4 MG: 2 INJECTION INTRAMUSCULAR; INTRAVENOUS at 14:20:00

## 2021-03-21 RX ADMIN — LIDOCAINE HYDROCHLORIDE 50 MG: 10 INJECTION, SOLUTION EPIDURAL; INFILTRATION; INTRACAUDAL; PERINEURAL at 13:12:00

## 2021-03-21 RX ADMIN — SODIUM CHLORIDE: 9 INJECTION, SOLUTION INTRAVENOUS at 14:34:00

## 2021-03-21 NOTE — ANESTHESIA PREPROCEDURE EVALUATION
Anesthesia PreOp Note    HPI:     Sharda Brock is a 54year old male who presents for preoperative consultation requested by: Evelin Ghosh MD    Date of Surgery: 3/20/2021 - 3/21/2021    Procedure(s):  LAPAROSCOPIC CHOLECYSTECTOMY, POSSIBLE OPEN  Indic NaCl infusion, , Intravenous, Continuous, SmBenson bagley MD, Last Rate: 100 mL/hr at 03/21/21 1009, Rate Change at 03/21/21 1009  [MAR Hold] ondansetron HCl (ZOFRAN) injection 4 mg, 4 mg, Intravenous, Q6H PRN, Brandy Lucio MD, 4 mg at [facility] Exercise: Not Asked        Bike Helmet: Not Asked        Seat Belt: Not Asked        Self-Exams: Not Asked    Social History Narrative      Not on file    Social Determinants of Health  Financial Resource Strain:       Difficulty of Paying Living Expenses 20  18 20   Temp:   98.5 °F (36.9 °C) 98.4 °F (36.9 °C)   TempSrc: Oral  Oral Oral   SpO2: 90% 94% 93% 93%   Weight:       Height:            Anesthesia Evaluation     Patient summary reviewed and Nursing notes reviewed    No history of anesthetic complica

## 2021-03-21 NOTE — ED INITIAL ASSESSMENT (HPI)
Pt to ED for worsening right upper abdominal pain that started yesterday. +Nausea. Denies vomiting or diarrhea. Taking Zofran which was prescribed yesterday at ED visit here.

## 2021-03-21 NOTE — ANESTHESIA PROCEDURE NOTES
Airway  Urgency: Elective      General Information and Staff    Patient location during procedure: OR  Anesthesiologist: Shaka Smoker, DO  Performed: anesthesiologist     Indications and Patient Condition  Indications for airway management: anesthe

## 2021-03-21 NOTE — CONSULTS
[facility]    Report of Consultation    Fady Alicia Patient Status:  Inpatient    1965 MRN [id number]   Location [facility] 4W/SW/SE Attending Kaylin De Paz Day # 0 PCP Jewell Felix MD     Date of MG/ML injection 0.5 mg, 0.5 mg, Intravenous, Q3H PRN      ondansetron 4 MG Oral Tablet Dispersible, Take 1 tablet (4 mg total) by mouth every 4 (four) hours as needed for Nausea.   Alum & Mag Hydroxide-Simeth 014-000-89 MG/5ML Oral Suspension, Take 10 mL by motion. Cervical back: Normal range of motion and neck supple. Skin:     General: Skin is warm and dry. Neurological:      Mental Status: He is alert and oriented to person, place, and time.    Psychiatric:         Speech: Speech normal.         Be 3/21/2021 at 8:42 AM     Finalized by (CST): Geeta Jacques MD on 3/21/2021 at 8:44 AM               Impression:     Cholecystitis      Recommendations:  Plan for lap power today. Thank you for allowing me to participate in the care of your patient.

## 2021-03-21 NOTE — ED NOTES
Spoke with vision. Pt US is concerning for cholecystitis. Pt has gallstones, thickening wall of the gallbladder and  fluid next to the gall gladder. Dr. Staley aware.

## 2021-03-21 NOTE — PROGRESS NOTES
Pt seen post mn orders reviewed  Pt with low o2 sats poss [place] needs outpt galvan  Walks easily for miles and climbs stairs np cp no sob  Previous anesthesia only for  Tendon repair in 1982    Low risk cardiac for surgery if ekg ok ordered stat  Likely [place] need

## 2021-03-21 NOTE — ANESTHESIA POSTPROCEDURE EVALUATION
Patient: Kaci Villafuerte    Procedure Summary     Date: 03/21/21 Room / Location: [address] [place] [address] MAIN OR 05 / [address] [place] [address] MAIN OR    Anesthesia Start: 4645 Anesthesia Stop: 9803    Procedure: LAPAROSCOPIC CHOLECYSTECTOMY, LAPAROSCOPIC LYSIS OF ADHESIONS (N/A ) Diagnosis:

## 2021-03-21 NOTE — PLAN OF CARE
Patient admitted for acute cholecystitis. Pain managed with PRN morphine then switched to PRN dilaudid d/t inadequate pain relief with morphine. Zofran given for nausea management. NPO maintained. IVF running and IV abx given.  Placed on 1L O2 for O2 satura on pain and pain management  - Manage/alleviate anxiety  - Utilize distraction and/or relaxation techniques  - Monitor for opioid side effects  - Notify MD/LIP if interventions unsuccessful or patient reports new pain  - Anticipate increased pain with acti

## 2021-03-21 NOTE — ED PROVIDER NOTES
Patient Seen in: [facility] Emergency Department      History   Patient presents with:  Abdomen/Flank Pain    Stated Complaint: Here yesterday; worsening abd pain    HPI/Subjective:   HPI    53 y/o male here last night w/ N/V and upper abd pain w/ obvious organomegaly. No left upper quadrant pain or lower abdominal pain. Musculoskeletal: Normal range of motion. No edema or tenderness. Neurological: Alert and oriented to person, place, and time. Skin: Skin is warm and dry.    Nursing note and vi RAPID SARS-COV-2 BY PCR          Gallbladder ultrasound. IMPRESSION:    Study limited by patient body habitus. Possible acute cholecystitis. There are gallstones in the gallbladder.   The gallbladder wall is thickened to 6 mm, and there is trace

## 2021-03-21 NOTE — OPERATIVE REPORT
Operative Report    Patient Name:  Traci Lim  MR:  [id number]  :  1965  DOS:  21    Preop Dx:  Acute cholecystitis [K81.0]  Postop Dx:  Acute cholecystitis [K81.0]  Procedure:    1. Laparoscopic cholecystectomy, modifier 22  2.  Laparo gallbladder was  using blunt dissection. I decompressed the gallbladder to allow grasping. The fundus was grasped and retracted cephalad. The infundibulum was grasped and retracted inferior, anterior, and to the right.   The peritoneum along the

## 2021-03-21 NOTE — ED NOTES
Orders for admission, patient is aware of plan and ready to go upstairs. Any questions, please call ED RN mercy at extension **[phone].    Type of COVID test sent:  Rapid  COVID Suspicion level: Low    Titratable drug(s) infusing: LR bolus   Zosyn  Rate:    LOC

## 2021-03-21 NOTE — PROGRESS NOTES
ADMISSION NOTE    54year old male with BMI of 40 and h/o hyperlipidemia and HTN  presents with 2 days of RUQ abdominal pain NV. Found to have gall stones thickened gall bladder wall with pericholecystic fluid and positive vela's sign.  . Available medic

## 2021-03-22 VITALS
TEMPERATURE: 99 F | SYSTOLIC BLOOD PRESSURE: 127 MMHG | HEIGHT: 67 IN | BODY MASS INDEX: 44.36 KG/M2 | HEART RATE: 82 BPM | OXYGEN SATURATION: 91 % | DIASTOLIC BLOOD PRESSURE: 80 MMHG | WEIGHT: 282.63 LBS | RESPIRATION RATE: 18 BRPM

## 2021-03-22 LAB
ALBUMIN SERPL-MCNC: 3 G/DL (ref 3.4–5)
ALBUMIN/GLOB SERPL: 0.8 {RATIO} (ref 1–2)
ALP LIVER SERPL-CCNC: 72 U/L
ALT SERPL-CCNC: 94 U/L
ANION GAP SERPL CALC-SCNC: 5 MMOL/L (ref 0–18)
AST SERPL-CCNC: 71 U/L (ref 15–37)
BASOPHILS # BLD AUTO: 0.02 X10(3) UL (ref 0–0.2)
BASOPHILS NFR BLD AUTO: 0.2 %
BILIRUB SERPL-MCNC: 1 MG/DL (ref 0.1–2)
BUN BLD-MCNC: 25 MG/DL (ref 7–18)
BUN/CREAT SERPL: 23.6 (ref 10–20)
CALCIUM BLD-MCNC: 8.3 MG/DL (ref 8.5–10.1)
CHLORIDE SERPL-SCNC: 106 MMOL/L (ref 98–112)
CO2 SERPL-SCNC: 30 MMOL/L (ref 21–32)
CREAT BLD-MCNC: 1.06 MG/DL
DEPRECATED RDW RBC AUTO: 44.6 FL (ref 35.1–46.3)
EOSINOPHIL # BLD AUTO: 0.02 X10(3) UL (ref 0–0.7)
EOSINOPHIL NFR BLD AUTO: 0.2 %
ERYTHROCYTE [DISTWIDTH] IN BLOOD BY AUTOMATED COUNT: 13.2 % (ref 11–15)
GLOBULIN PLAS-MCNC: 3.7 G/DL (ref 2.8–4.4)
GLUCOSE BLD-MCNC: 108 MG/DL (ref 70–99)
HAV IGM SER QL: 2.4 MG/DL (ref 1.6–2.6)
HCT VFR BLD AUTO: 40 %
HGB BLD-MCNC: 12.8 G/DL
IMM GRANULOCYTES # BLD AUTO: 0.16 X10(3) UL (ref 0–1)
IMM GRANULOCYTES NFR BLD: 1.3 %
LYMPHOCYTES # BLD AUTO: 1 X10(3) UL (ref 1–4)
LYMPHOCYTES NFR BLD AUTO: 8.4 %
M PROTEIN MFR SERPL ELPH: 6.7 G/DL (ref 6.4–8.2)
MCH RBC QN AUTO: 29.6 PG (ref 26–34)
MCHC RBC AUTO-ENTMCNC: 32 G/DL (ref 31–37)
MCV RBC AUTO: 92.6 FL
MONOCYTES # BLD AUTO: 0.99 X10(3) UL (ref 0.1–1)
MONOCYTES NFR BLD AUTO: 8.3 %
NEUTROPHILS # BLD AUTO: 9.73 X10 (3) UL (ref 1.5–7.7)
NEUTROPHILS # BLD AUTO: 9.73 X10(3) UL (ref 1.5–7.7)
NEUTROPHILS NFR BLD AUTO: 81.6 %
OSMOLALITY SERPL CALC.SUM OF ELEC: 297 MOSM/KG (ref 275–295)
PHOSPHATE SERPL-MCNC: 2 MG/DL (ref 2.5–4.9)
PLATELET # BLD AUTO: 204 10(3)UL (ref 150–450)
POTASSIUM SERPL-SCNC: 3.7 MMOL/L (ref 3.5–5.1)
RBC # BLD AUTO: 4.32 X10(6)UL
SODIUM SERPL-SCNC: 141 MMOL/L (ref 136–145)
WBC # BLD AUTO: 11.9 X10(3) UL (ref 4–11)

## 2021-03-22 PROCEDURE — 99239 HOSP IP/OBS DSCHRG MGMT >30: CPT | Performed by: HOSPITALIST

## 2021-03-22 RX ORDER — HYDROCODONE BITARTRATE AND ACETAMINOPHEN 5; 325 MG/1; MG/1
1 TABLET ORAL EVERY 6 HOURS PRN
Qty: 12 TABLET | Refills: 0 | Status: SHIPPED | OUTPATIENT
Start: 2021-03-22 | End: 2021-04-12 | Stop reason: ALTCHOICE

## 2021-03-22 RX ORDER — LEVOFLOXACIN 750 MG/1
750 TABLET ORAL DAILY
Qty: 7 TABLET | Refills: 0 | Status: SHIPPED | OUTPATIENT
Start: 2021-03-22 | End: 2021-04-12 | Stop reason: ALTCHOICE

## 2021-03-22 RX ORDER — LEVOFLOXACIN 500 MG/1
750 TABLET, FILM COATED ORAL DAILY
Status: DISCONTINUED | OUTPATIENT
Start: 2021-03-22 | End: 2021-03-22

## 2021-03-22 RX ORDER — POLYETHYLENE GLYCOL 3350 17 G/17G
17 POWDER, FOR SOLUTION ORAL DAILY PRN
Qty: 30 EACH | Refills: 0 | Status: SHIPPED | OUTPATIENT
Start: 2021-03-22 | End: 2021-04-12 | Stop reason: ALTCHOICE

## 2021-03-22 RX ORDER — PSEUDOEPHEDRINE HCL 30 MG
100 TABLET ORAL 2 TIMES DAILY PRN
Qty: 20 CAPSULE | Refills: 0 | Status: SHIPPED | OUTPATIENT
Start: 2021-03-22 | End: 2021-04-12 | Stop reason: ALTCHOICE

## 2021-03-22 NOTE — PLAN OF CARE
Patient alert and oriented x4. Up independently. Low fiber soft diet, tolerating well. Continuing IV antibiotics. Pain controlled with PRN [place] and scheduled Toradol. Phosphorous replaced. Abdominal incisions clean/dry/intact. Voiding freely.  Call light i management  - Manage/alleviate anxiety  - Utilize distraction and/or relaxation techniques  - Monitor for opioid side effects  - Notify MD/LIP if interventions unsuccessful or patient reports new pain  - Anticipate increased pain with activity and pre-medi

## 2021-03-22 NOTE — PROGRESS NOTES
[facility] Pharmacy Note: Antimicrobial Weight Based Dose Adjustment for: piperacillin/tazobactam (Jaqui Renteria)    Nato Montenegro is a 54year old patient who has been prescribed piperacillin/tazobactam (ZOSYN) 3.375g every 8 hours.     Estimated Creatinine Clearanc

## 2021-03-22 NOTE — DISCHARGE SUMMARY
Dc summary#[id number]  > 30 min spent on [facility] Discharge Diagnoses: cholecystitis    Lace+ Score: 32  59-90 High Risk  29-58 Medium Risk  0-28   Low Risk.     TCM Follow-Up Recommendation:  LACE < 29: Low Risk of readmission after discharge from the

## 2021-03-22 NOTE — H&P
[facility]    PATIENT'S NAME: Wu Pradhan   ATTENDING PHYSICIAN: Sherman De Paz MD   PATIENT ACCOUNT#:   [id number]    LOCATION:  [address] [place] [address] RECORD #:   [id number]       YOB: 1965  ADMISSION DATE:       0 0.5, alkaline phosphatase of 82, direct bilirubin of 0.1, lipase of 141. The patient's liver function tests are exactly the same as in December 2020.   His glucose was 141, sodium 139, potassium 3.8, chloride 105, CO2 of 31, BUN of 23 with a creatinine 0.9 no melena or hematochezia. He has no chest pain or shortness of breath. There are otherwise no additional pertinent positives or negatives on the 12-point review of systems except as listed in the History of Present Illness.       PHYSICAL EXAMINATION: have placed the patient on continuous pulse ox monitoring because of the concern for sleep apnea, and would recommend sleep study in the outpatient setting once he has recovered from his current issues and also placed on high-risk O2 protocol.   4.   Hyperl

## 2021-03-22 NOTE — PLAN OF CARE
VSS, no acute events overnight. Pt wore 2L while sleeping to maintain O2 sats. Pain controlled with Norco, scheduled toradol, heat packs. Pt plans to discharge back home with his wife pending final clearance.  Plan to continue to monitor, administer medicat management  - Manage/alleviate anxiety  - Utilize distraction and/or relaxation techniques  - Monitor for opioid side effects  - Notify MD/LIP if interventions unsuccessful or patient reports new pain  - Anticipate increased pain with activity and pre-medi

## 2021-03-22 NOTE — CM/SW NOTE
03/22/21 1100   Discharge disposition   Expected discharge disposition Home or Self   Referrals provided No   Discharge transportation Private car     Per nursing rounds pt will dc home today with no home care needs.    RN to contact SW/CM if needs arise

## 2021-03-22 NOTE — PROGRESS NOTES
[facility]    Progress Note    Zackary Ayon Patient Status:  Inpatient    1965 MRN [id number]   Location [facility] 4W/SW/SE Attending Kaylin De Paz Day # 1 PCP Rashi Sandoval MD     Assessment and Pl (L) 03/22/2021    HCT 40.0 03/22/2021    .0 03/22/2021    CREATSERUM 1.06 03/22/2021    BUN 25 (H) 03/22/2021     03/22/2021    K 3.7 03/22/2021     03/22/2021    CO2 30.0 03/22/2021     (H) 03/22/2021    CA 8.3 (L) 03/22/2021

## 2021-03-22 NOTE — PLAN OF CARE
S/p today lap power/lysis of adhesions with Dr. Columba Gutierrez, tolerating clear liquids, scheduled toradol, awaiting patient to void, 4L O2, wife at bedside and updated on care plan.     Problem: Patient Centered Care  Goal: Patient preferences are identified and inte MD/LIP if interventions unsuccessful or patient reports new pain  - Anticipate increased pain with activity and pre-medicate as appropriate  Outcome: Progressing     Problem: GASTROINTESTINAL - ADULT  Goal: Minimal or absence of nausea and vomiting  [place]

## 2021-03-22 NOTE — PAYOR COMM NOTE
--------------  ADMISSION REVIEW     Payor: Zaira Hollowaygustavo [employer]  Subscriber #:  [id number]  Authorization Number: [id number]    Admit date: 3/21/21  Admit time:  1:33 AM       Admitting Physician: Sasha Ku MD  Attending Physician:  Felisa Paul (!) 162/70   Pulse 88   Temp 98 °F (36.7 °C) (Temporal)   Resp 18   Ht 170.2 cm (5' 7\")   Wt 113.4 kg   SpO2 96%   BMI 39.16 kg/m²         Physical Exam    Constitutional: Oriented to person, place, and time. Appears well-developed. No distress.    Head: Lymphocyte Absolute 0.66 (*)     Monocyte Absolute 1.42 (*)     All other components within normal limits   CBC WITH DIFFERENTIAL WITH PLATELET    Narrative: The following orders were created for panel order CBC WITH DIFFERENTIAL WITH PLATELET.   Proc Noted POA    * (Principal) Cholecystitis K81.9 3/20/2021 Unknown                         Signed by Amarilys Quiles MD on 3/21/2021 12:07 AM            H&P - H&P Note      H&P filed by Fernando Lopez MD at 3/22/2021  7:33 AM / Draft: Not Electroni pretty intense and in retrospect, he regrets not having it evaluated.   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 REVIEW OF SYSTEMS:  Twelve systems were reviewed. The patient states his appetite has been good until he developed these symptoms.   Denied any dysuria, increased frequency of urination or hematu necessary. 3.   Hypoxemia. O2 saturations in the emergency room were 96%, but did drop to 93% to 90% overnight, likely related to undiagnosed sleep apnea and obesity hypoventilation syndrome.   We will, however, obtain chest x-ray to insure no surprises p 5,000 Units     Date Action Dose Route User    3/22/2021 0848 Given 5,000 Units Subcutaneous (Right Upper Arm) Micheal Olvera RN    3/21/2021 2113 Given 5,000 Units Subcutaneous (Right Upper Arm) Byron Sherman, SHAUN      HYDROcodone-acetaminophen [facility] adhesions  Surgeon:  Amira Blanca MD  Surgical Assistant.: Ann Gaona CSA  EBL: 374 ml  Complication:  None     INDICATION:  Pt is a 54year old male who with Acute cholecystitis [K81.0] who is scheduled for a Procedure(s):   .     CONSENT:  An informed lateral aspect of the gallbladder/liver edge were incised using hook cautery. I then used the [place] ligasure to free the lower 1/3 of the gallbladder from the liver.   This portion of the procedure was tedious and laborious due to the inflammation and f

## 2021-03-23 NOTE — DISCHARGE SUMMARY
[facility]    PATIENT'S NAME: Luiz Toth   ATTENDING PHYSICIAN: Sherman De Paz MD   PATIENT ACCOUNT#:   [id number]    LOCATION:  [address] [place] [address] RECORD #:   [id number]       YOB: 1965  ADMISSION DATE:       0 medications. 4.   Hyperlipidemia. Continue medication. 5.   Hepatic steatosis. Follow up LFTs. 6.   Nonfasting hyperglycemia. 7.   Leukocytosis secondary to cholecystitis. 8.   DVT prophylaxis. SCDs and subcutaneous heparin.     CONDITION ON [facility]

## 2021-03-23 NOTE — PAYOR COMM NOTE
--------------  DISCHARGE REVIEW    Payor: Mikayla Oliveros [employer]  Subscriber #:  [id number]  Authorization Number: [id number]    Admit date: 3/21/21  Admit time:   1:33 AM  Discharge Date: 3/22/2021  5:49 PM     Admitting Physician: Fernando Lopez, 03/21/21 1456 104/73 — — 94 22 96 %   03/21/21 1446 119/73 97.9 °F (36.6 °C) — 99 19 95 %   03/21/21 1436 (!) 175/83 97.9 °F (36.6 °C) Tympanic 98 20 92 %   03/21/21 1153 (!) 171/79 98.4 °F (36.9 °C) Oral 92 20 93 %             Intake/Output features of hepatic steatosis. 3. The common bile duct is not well delineated. 4. Lesser incidental findings as above. A preliminary report was issued by the [address] [place] [address] Radiology teleradiology service. There are no major discrepancies. Remote.  Dictated by (CST General anesthesia was established and the abdomen was prepped in standard fashion. Pneumoperitoneum was obtained using open technique through a supra-umbilical incision. A 12-mm trocar was inserted under direct visualization and no injury occurred.   Exa fascia was closed using 0 vicryl. The skin incisions were closed using 4-0 vicryl. Sterile dressings were applied. All instrument and sponge counts were correct.   I was present during the critical portions of the procedure.     Peyton Adam MD

## 2021-04-12 ENCOUNTER — OFFICE VISIT (OUTPATIENT)
Dept: INTERNAL MEDICINE CLINIC | Facility: CLINIC | Age: 56
End: 2021-04-12
Payer: COMMERCIAL

## 2021-04-12 VITALS
WEIGHT: 273 LBS | HEIGHT: 67 IN | BODY MASS INDEX: 42.85 KG/M2 | DIASTOLIC BLOOD PRESSURE: 80 MMHG | HEART RATE: 86 BPM | SYSTOLIC BLOOD PRESSURE: 134 MMHG | RESPIRATION RATE: 20 BRPM

## 2021-04-12 DIAGNOSIS — R79.89 ELEVATED LFTS: ICD-10-CM

## 2021-04-12 DIAGNOSIS — Z00.00 ROUTINE PHYSICAL EXAMINATION: Primary | ICD-10-CM

## 2021-04-12 DIAGNOSIS — Z90.49 S/P LAPAROSCOPIC CHOLECYSTECTOMY: ICD-10-CM

## 2021-04-12 DIAGNOSIS — I10 ESSENTIAL HYPERTENSION: ICD-10-CM

## 2021-04-12 DIAGNOSIS — E78.5 HYPERLIPIDEMIA, UNSPECIFIED HYPERLIPIDEMIA TYPE: ICD-10-CM

## 2021-04-12 PROCEDURE — 3075F SYST BP GE 130 - 139MM HG: CPT | Performed by: INTERNAL MEDICINE

## 2021-04-12 PROCEDURE — 99396 PREV VISIT EST AGE 40-64: CPT | Performed by: INTERNAL MEDICINE

## 2021-04-12 PROCEDURE — 3079F DIAST BP 80-89 MM HG: CPT | Performed by: INTERNAL MEDICINE

## 2021-04-12 PROCEDURE — 3008F BODY MASS INDEX DOCD: CPT | Performed by: INTERNAL MEDICINE

## 2021-04-12 RX ORDER — AMLODIPINE BESYLATE 5 MG/1
5 TABLET ORAL DAILY
Qty: 90 TABLET | Refills: 1 | Status: SHIPPED | OUTPATIENT
Start: 2021-04-12 | End: 2021-10-28

## 2021-04-12 NOTE — PROGRESS NOTES
HPI:    Patient ID: Caroline Benson is a 54year old male. HPI  Patient is here today requesting a physical exam and follow-up on chronic medical issues. I last saw him in the office in April 2019. Weight was up at that time.   Advised blood work an History    Tobacco Use      Smoking status: Never Smoker      Smokeless tobacco: Never Used    Vaping Use      Vaping Use: Never used    Alcohol use: Yes      Comment: beer & wine, occasionally    Drug use: No         Review of Systems   Constitutional: Ne rhythm. Heart sounds: Normal heart sounds. No murmur heard. Pulmonary:      Effort: Pulmonary effort is normal.      Breath sounds: Normal breath sounds. No wheezing or rales. Abdominal:      General: Bowel sounds are normal.      Palpations:  Ab hospital.  Right now just continue the amlodipine. Check blood pressure at home and follow-up in 3 months. If still elevated consider going on the triamterene hydrochlorothiazide or different medication.     3. Hyperlipidemia, unspecified hyperlipidemia t

## 2021-04-16 ENCOUNTER — LAB ENCOUNTER (OUTPATIENT)
Dept: LAB | Facility: HOSPITAL | Age: 56
End: 2021-04-16
Attending: INTERNAL MEDICINE
Payer: COMMERCIAL

## 2021-04-16 DIAGNOSIS — Z00.00 ROUTINE PHYSICAL EXAMINATION: ICD-10-CM

## 2021-04-16 PROCEDURE — 80053 COMPREHEN METABOLIC PANEL: CPT

## 2021-04-16 PROCEDURE — 36415 COLL VENOUS BLD VENIPUNCTURE: CPT

## 2021-04-16 PROCEDURE — 84443 ASSAY THYROID STIM HORMONE: CPT

## 2021-04-16 PROCEDURE — 80061 LIPID PANEL: CPT

## 2021-04-22 ENCOUNTER — OFFICE VISIT (OUTPATIENT)
Dept: DERMATOLOGY CLINIC | Facility: CLINIC | Age: 56
End: 2021-04-22
Payer: COMMERCIAL

## 2021-04-22 DIAGNOSIS — L91.8 INFLAMED ACROCHORDON: Primary | ICD-10-CM

## 2021-04-22 DIAGNOSIS — L57.0 AK (ACTINIC KERATOSIS): ICD-10-CM

## 2021-04-22 DIAGNOSIS — D22.9 MULTIPLE NEVI: ICD-10-CM

## 2021-04-22 DIAGNOSIS — L98.9 PAINFUL SKIN LESION: ICD-10-CM

## 2021-04-22 DIAGNOSIS — D23.9 BENIGN NEOPLASM OF SKIN, UNSPECIFIED LOCATION: ICD-10-CM

## 2021-04-22 PROCEDURE — 99202 OFFICE O/P NEW SF 15 MIN: CPT | Performed by: DERMATOLOGY

## 2021-04-22 PROCEDURE — 17000 DESTRUCT PREMALG LESION: CPT | Performed by: DERMATOLOGY

## 2021-04-22 PROCEDURE — 11200 RMVL SKIN TAGS UP TO&INC 15: CPT | Performed by: DERMATOLOGY

## 2021-05-02 NOTE — PROGRESS NOTES
Tish Peabody is a 54year old male. HPI:     CC:  Patient presents with:  Full Skin Exam: new pt. presents for full skin exam. No hx of skin CA. Denies mole changes.    Acrochordon: skin tags to left thigh, neck, axillae - requesting removal Vaping Use      Vaping Use: Never used    Substance and Sexual Activity      Alcohol use: Yes        Comment: beer & wine, occasionally      Drug use: No      Sexual activity: Not Currently        Partners: Female    Other Topics      Concerns:        Meryl presents with:  Full Skin Exam: new pt. presents for full skin exam. No hx of skin CA. Denies mole changes. Acrochordon: skin tags to left thigh, neck, axillae - requesting removal    New patient irritated lesions at axillary neck, flank.   Concerned with keratotic papules left mid cheek  Actinic keratoses. Precancerous nature discussed. Lesions treated with cryo- . Biopsy if not resolved. Moderate sun damage head neck arms hands.   Multiple inflamed pedunculated papules at axillary, right [id number]  Sc

## 2021-05-28 ENCOUNTER — NURSE TRIAGE (OUTPATIENT)
Dept: INTERNAL MEDICINE CLINIC | Facility: CLINIC | Age: 56
End: 2021-05-28

## 2021-05-28 RX ORDER — CIPROFLOXACIN 500 MG/1
500 TABLET, FILM COATED ORAL 2 TIMES DAILY
Qty: 6 TABLET | Refills: 0 | Status: SHIPPED | OUTPATIENT
Start: 2021-05-28 | End: 2021-05-31

## 2021-05-28 NOTE — TELEPHONE ENCOUNTER
Given the recent history of travel, we will go ahead and treat for traveler's diarrhea with Cipro 500 mg twice a day for 3 days. Please send the prescription that I have pended into whichever pharmacy the patient prefers.   Otherwise agree with nurse advic

## 2021-05-28 NOTE — TELEPHONE ENCOUNTER
Action Requested: Summary for Provider     []  Critical Lab, Recommendations Needed  [x] Need Additional Advice  []   FYI    []   Need Orders  [] Need Medications Sent to Pharmacy  []  Other     Dr. Barbara Wong,  Please advise on any recommendations.   Thank

## 2021-05-28 NOTE — TELEPHONE ENCOUNTER
Aleln Pina I received a call from pt wanting to know if you had any further recommendation for him. Please see message below.  Thanks

## 2021-05-28 NOTE — TELEPHONE ENCOUNTER
Pt was called and informed of   Message below and pt wanted to start the abx. Pt agreed with the abx. Abx was sent to the pharmacy.

## 2021-07-12 RX ORDER — FENOFIBRATE 145 MG/1
TABLET, COATED ORAL
Qty: 90 TABLET | Refills: 1 | Status: SHIPPED | OUTPATIENT
Start: 2021-07-12 | End: 2022-01-10

## 2021-10-28 RX ORDER — AMLODIPINE BESYLATE 5 MG/1
TABLET ORAL
Qty: 90 TABLET | Refills: 1 | Status: SHIPPED | OUTPATIENT
Start: 2021-10-28

## 2021-12-06 ENCOUNTER — NURSE ONLY (OUTPATIENT)
Dept: GASTROENTEROLOGY | Facility: CLINIC | Age: 56
End: 2021-12-06

## 2021-12-06 NOTE — PROGRESS NOTES
Dx: average risk crc screening  Colonoscopy with IV or MAC sedation  Split dose golytely preparation, sent to pharmacy  Please review prep instructions with patient  thanks

## 2021-12-06 NOTE — PROGRESS NOTES
Nikia Kan 89 patient for his scheduled telephone colon screening.      PCP visit on 4/12/2021 and referred to Gi for his 1st colonoscopy      CBC from 3/22/21 resulted in epic     Anticoagulants: no   Diabetic Meds:  No   BP meds(Ace inhibitors/ARB's)

## 2021-12-07 ENCOUNTER — PATIENT MESSAGE (OUTPATIENT)
Dept: GASTROENTEROLOGY | Facility: CLINIC | Age: 56
End: 2021-12-07

## 2021-12-07 RX ORDER — SOD SULF/POT CHLORIDE/MAG SULF 1.479 G
24 TABLET ORAL AS DIRECTED
Qty: 24 TABLET | Refills: 0 | Status: SHIPPED | OUTPATIENT
Start: 2021-12-07 | End: 2021-12-08

## 2021-12-07 NOTE — PROGRESS NOTES
Dr. Concepción Castillo,    Called patient to help assist with scheduling procedure. He is now requesting SUTAB as his form of cln prep.      Please advise     Thank you

## 2021-12-07 NOTE — PROGRESS NOTES
MD Bro Mann, LPN  Caller: Unspecified Deannie Halsted,  3:59 PM)  Kari Dance sent, pls review instructions

## 2021-12-07 NOTE — PROGRESS NOTES
Scheduled for:  Colonoscopy [address]    Provider Name:  Dr. Alamo Estimable  Date:  1/18/2022  Location:  [facility]  Sedation:  IV  Time:   9:45 am (pt is aware to arrive at 8:45 am)  Prep:  Split dose golytely  Meds/Allergies Reconciled?: Physician reviewed    Diagnosis with c

## 2021-12-08 ENCOUNTER — TELEPHONE (OUTPATIENT)
Dept: GASTROENTEROLOGY | Facility: CLINIC | Age: 56
End: 2021-12-08

## 2021-12-08 NOTE — TELEPHONE ENCOUNTER
Current Outpatient Medications   Medication Sig Dispense Refill   • Sodium Sulfate-Mag Sulfate-KCl (SUTAB) [id number] MG Oral Tab Take 24 tablets by mouth As Directed for 1 day. 24 tablet 0     Per pharmacy alternative is requested.   Not covered by [employer]

## 2021-12-08 NOTE — TELEPHONE ENCOUNTER
I spoke to Rishi Walters LPN and she is going to mail the pt a coupon card for the [employer] should only cost $40.00 w/coupon card

## 2021-12-15 ENCOUNTER — TELEPHONE (OUTPATIENT)
Dept: GASTROENTEROLOGY | Facility: CLINIC | Age: 56
End: 2021-12-15

## 2021-12-15 DIAGNOSIS — Z12.11 COLON CANCER SCREENING: Primary | ICD-10-CM

## 2021-12-15 NOTE — TELEPHONE ENCOUNTER
From: Miguelangel Cowan  To: Eleuterio Choudhury  Sent: 12/7/2021 2:17 PM CST  Subject: Colonoscopy Bowel Preparation Instructions/ Copy & coupon mailed to your home address    [facility], [facility]    Kalyani Garcias MD    Colonoscopy Bowel Prepar Open 1 bottle of 12 tablets (first bottle of SUTAB)  - Step 2: Fill the provided container with 16 ounces of water (up to the fill line). Swallow each tablet with a sip of water and drink the entire amount of water over 15-20 minutes.   - Step 3: One (1) ho check with your primary care doctor regarding these medications.  Bring your insulin with you to the exam.   If you are taking blood thinners (anti-coagulants or anti-platelet agents), talk to your gastroenterologist about these medications - they may need body temperature, easily digested and leave no residue in your intestinal tract.      NOT acceptable:  Milk or any dairy products   Tomato juice   Fruit juices with pulp   Any liquids that are RED, BLUE or PURPLE        Clear Liquids Allowed:  Water Soup br

## 2021-12-15 NOTE — TELEPHONE ENCOUNTER
Schedulers:    Patient sent below [employer] message and would like to reschedule the procedure. I left the patient on the schedule for now since we are booking out - and may want to keep given limited availability.     Please assist.    Thank you        I

## 2021-12-22 NOTE — TELEPHONE ENCOUNTER
This procedure was not entered as a electronic case request when scheduled.  So I entered it as a new case but I am writing the notes as it was rescheduled since it was scheduled in Epic      Rescheduled for:  Colonoscopy [address]     Provider Name:  Dr. Agarwal Pod

## 2022-01-10 RX ORDER — FENOFIBRATE 145 MG/1
145 TABLET, COATED ORAL DAILY
Qty: 90 TABLET | Refills: 1 | Status: SHIPPED | OUTPATIENT
Start: 2022-01-10 | End: 2022-07-09

## 2022-01-10 NOTE — TELEPHONE ENCOUNTER
Please review. Protocol failed or does not have a protocol.      Requested Prescriptions   Pending Prescriptions Disp Refills    FENOFIBRATE 145 MG Oral Tab [Pharmacy Med Name: FENOFIBRATE 145 MG TABLET] 90 tablet 1     Sig: TAKE 1 TABLET BY MOUTH EVERY DAY        Cholesterol Medication Protocol Failed - 1/10/2022 12:16 AM        Failed - Last ALT < 80       Lab Results   Component Value Date    ALT 85 (H) 04/16/2021             Passed - ALT in past 12 months        Passed - LDL in past 12 months        Passed - Last LDL < 130     Lab Results   Component Value Date     (H) 04/16/2021               Passed - Appointment in past 12 or next 3 months            Future Appointments         Provider Department Appt Notes    In 3 weeks MA, PROCEDURE [facility] Colon @ [address] [place] [address]           Recent Outpatient Visits              1 month ago     [facility]    Nurse Only    8 months ago [facility] Candi Fuller MD    Office Visit    9 months ago Routine physical examination    [facility], [facility], [address], Twila Estrella MD    Office Visit    1 year ago Encounter for screening colonoscopy    [facility], [address], Eileen Theodore APRN    Virtual Phone E/M    2 years ago Essential hypertension    [address] [facility], Twila Estrella MD    Office Visit

## 2022-01-27 RX ORDER — MIDAZOLAM HYDROCHLORIDE 1 MG/ML
1 INJECTION INTRAMUSCULAR; INTRAVENOUS EVERY 5 MIN PRN
Status: CANCELLED | OUTPATIENT
Start: 2022-01-27

## 2022-01-27 RX ORDER — SODIUM CHLORIDE, SODIUM LACTATE, POTASSIUM CHLORIDE, CALCIUM CHLORIDE 600; 310; 30; 20 MG/100ML; MG/100ML; MG/100ML; MG/100ML
INJECTION, SOLUTION INTRAVENOUS CONTINUOUS
Status: CANCELLED | OUTPATIENT
Start: 2022-01-27

## 2022-01-27 RX ORDER — SODIUM CHLORIDE 0.9 % (FLUSH) 0.9 %
10 SYRINGE (ML) INJECTION AS NEEDED
Status: CANCELLED | OUTPATIENT
Start: 2022-01-27

## 2022-01-29 ENCOUNTER — LAB ENCOUNTER (OUTPATIENT)
Dept: LAB | Age: 57
End: 2022-01-29
Attending: INTERNAL MEDICINE
Payer: COMMERCIAL

## 2022-01-29 DIAGNOSIS — Z01.818 PRE-OP TESTING: ICD-10-CM

## 2022-01-30 LAB — SARS-COV-2 RNA RESP QL NAA+PROBE: NOT DETECTED

## 2022-01-31 ENCOUNTER — TELEPHONE (OUTPATIENT)
Dept: GASTROENTEROLOGY | Facility: CLINIC | Age: 57
End: 2022-01-31

## 2022-01-31 NOTE — TELEPHONE ENCOUNTER
Pt states that he call insurance company today and was told that colonoscopy scheduled for 2/1/22 needs prior auth. Please call [phone]/[phone].

## 2022-02-01 ENCOUNTER — HOSPITAL ENCOUNTER (OUTPATIENT)
Facility: HOSPITAL | Age: 57
Setting detail: HOSPITAL OUTPATIENT SURGERY
Discharge: HOME | End: 2022-02-01
Attending: INTERNAL MEDICINE | Admitting: INTERNAL MEDICINE
Payer: COMMERCIAL

## 2022-02-01 DIAGNOSIS — Z01.818 PRE-OP TESTING: Primary | ICD-10-CM

## 2022-02-01 DIAGNOSIS — Z12.11 COLON CANCER SCREENING: ICD-10-CM

## 2022-02-01 PROCEDURE — 43239 EGD BIOPSY SINGLE/MULTIPLE: CPT | Performed by: INTERNAL MEDICINE

## 2022-02-01 PROCEDURE — 0DBN8ZX EXCISION OF SIGMOID COLON, VIA NATURAL OR ARTIFICIAL OPENING ENDOSCOPIC, DIAGNOSTIC: ICD-10-PCS | Performed by: INTERNAL MEDICINE

## 2022-02-01 PROCEDURE — 0DBM8ZX EXCISION OF DESCENDING COLON, VIA NATURAL OR ARTIFICIAL OPENING ENDOSCOPIC, DIAGNOSTIC: ICD-10-PCS | Performed by: INTERNAL MEDICINE

## 2022-02-01 PROCEDURE — 45380 COLONOSCOPY AND BIOPSY: CPT | Performed by: INTERNAL MEDICINE

## 2022-02-01 PROCEDURE — G0500 MOD SEDAT ENDO SERVICE >5YRS: HCPCS | Performed by: INTERNAL MEDICINE

## 2022-02-01 PROCEDURE — 45385 COLONOSCOPY W/LESION REMOVAL: CPT | Performed by: INTERNAL MEDICINE

## 2022-02-01 PROCEDURE — 0DB68ZX EXCISION OF STOMACH, VIA NATURAL OR ARTIFICIAL OPENING ENDOSCOPIC, DIAGNOSTIC: ICD-10-PCS | Performed by: INTERNAL MEDICINE

## 2022-02-01 PROCEDURE — 0DBH8ZX EXCISION OF CECUM, VIA NATURAL OR ARTIFICIAL OPENING ENDOSCOPIC, DIAGNOSTIC: ICD-10-PCS | Performed by: INTERNAL MEDICINE

## 2022-02-01 RX ORDER — PANTOPRAZOLE SODIUM 40 MG/1
40 TABLET, DELAYED RELEASE ORAL
Qty: 60 TABLET | Refills: 1 | Status: SHIPPED | OUTPATIENT
Start: 2022-02-01 | End: 2022-03-02

## 2022-02-01 RX ORDER — MIDAZOLAM HYDROCHLORIDE 1 MG/ML
INJECTION INTRAMUSCULAR; INTRAVENOUS
Status: DISCONTINUED | OUTPATIENT
Start: 2022-02-01 | End: 2022-02-01

## 2022-02-01 NOTE — PRE-SEDATION ASSESSMENT
Physician Pre-Sedation Assessment    Pre-Sedation Assessment:    Sedation History: Previous Sedation with No Complications and Airway Assessed    Cardiac: normal S1, S2  Respiratory: breath sounds clear bilaterally   Abdomen: soft, BS (+), non-tender    ASA Classification: 1.  Normal healthy patient    Plan: IV Sedation

## 2022-02-03 VITALS
RESPIRATION RATE: 21 BRPM | HEIGHT: 66 IN | HEART RATE: 75 BPM | OXYGEN SATURATION: 99 % | BODY MASS INDEX: 40.18 KG/M2 | DIASTOLIC BLOOD PRESSURE: 81 MMHG | WEIGHT: 250 LBS | SYSTOLIC BLOOD PRESSURE: 129 MMHG

## 2022-02-10 ENCOUNTER — TELEPHONE (OUTPATIENT)
Dept: GASTROENTEROLOGY | Facility: CLINIC | Age: 57
End: 2022-02-10

## 2022-02-11 NOTE — TELEPHONE ENCOUNTER
Recall colon in 5 years per Dr Leticia Mason.  Colon done 02/01/2022    Due 02/01/2027    Message sent to pt outreach and health maintenance updated

## 2022-02-11 NOTE — TELEPHONE ENCOUNTER
Called pt to discuss path results from recent screening colonoscopy, no answer left message    3 small adenomas  Recommend repeat colonoscopy in 5 years based on current [facility] guidelines    Gastric biopsies unremarkable, negative for h pylori      GI RN --   1. please put in recall colonoscopy for 5 years  2. Recommend f/u gi clinic to further discuss upper gi symptoms.

## 2022-03-02 RX ORDER — PANTOPRAZOLE SODIUM 40 MG/1
TABLET, DELAYED RELEASE ORAL
Qty: 60 TABLET | Refills: 1 | Status: SHIPPED | OUTPATIENT
Start: 2022-03-02 | End: 2022-03-08

## 2022-03-08 ENCOUNTER — TELEPHONE (OUTPATIENT)
Dept: GASTROENTEROLOGY | Facility: CLINIC | Age: 57
End: 2022-03-08

## 2022-03-08 NOTE — TELEPHONE ENCOUNTER
Dr Gust Galeazzi,    Please sign pended 90 say supply of pantoprazole    Started pantoprazole per EGD from 02/10/2022    Pt still needs f/u with GI. LMTCB

## 2022-03-16 RX ORDER — PANTOPRAZOLE SODIUM 40 MG/1
40 TABLET, DELAYED RELEASE ORAL
Qty: 180 TABLET | Refills: 1 | Status: SHIPPED | OUTPATIENT
Start: 2022-03-16

## 2022-03-16 NOTE — TELEPHONE ENCOUNTER
I spoke to the pt and we scheduled a GI f/u appt for Ugi symptoms.  Date, time and location verified    Your Appointments    Wednesday April 13, 2022 10:00 AM  Consult with Magaly Moon Capp, [address], [address] [place] [address] ([address]) [address] [phone]

## 2022-04-13 ENCOUNTER — OFFICE VISIT (OUTPATIENT)
Dept: GASTROENTEROLOGY | Facility: CLINIC | Age: 57
End: 2022-04-13
Payer: COMMERCIAL

## 2022-04-13 VITALS
SYSTOLIC BLOOD PRESSURE: 159 MMHG | BODY MASS INDEX: 44.68 KG/M2 | TEMPERATURE: 99 F | HEART RATE: 77 BPM | HEIGHT: 66 IN | WEIGHT: 278 LBS | DIASTOLIC BLOOD PRESSURE: 88 MMHG

## 2022-04-13 DIAGNOSIS — R05.8 DRY COUGH: ICD-10-CM

## 2022-04-13 DIAGNOSIS — K44.9 HIATAL HERNIA WITH GERD: Primary | ICD-10-CM

## 2022-04-13 DIAGNOSIS — K21.9 HIATAL HERNIA WITH GERD: Primary | ICD-10-CM

## 2022-04-13 PROCEDURE — 3008F BODY MASS INDEX DOCD: CPT | Performed by: NURSE PRACTITIONER

## 2022-04-13 PROCEDURE — 3077F SYST BP >= 140 MM HG: CPT | Performed by: NURSE PRACTITIONER

## 2022-04-13 PROCEDURE — 3079F DIAST BP 80-89 MM HG: CPT | Performed by: NURSE PRACTITIONER

## 2022-04-13 PROCEDURE — 99214 OFFICE O/P EST MOD 30 MIN: CPT | Performed by: NURSE PRACTITIONER

## 2022-04-21 RX ORDER — AMLODIPINE BESYLATE 5 MG/1
5 TABLET ORAL DAILY
Qty: 90 TABLET | Refills: 0 | Status: SHIPPED | OUTPATIENT
Start: 2022-04-21 | End: 2022-09-08

## 2022-04-21 NOTE — TELEPHONE ENCOUNTER
Refill passed per [place] [employer] [facility] protocol.     Requested Prescriptions   Pending Prescriptions Disp Refills    AMLODIPINE 5 MG Oral Tab [Pharmacy Med Name: AMLODIPINE BESYLATE 5 MG TAB] 90 tablet 1     Sig: TAKE 1 TABLET BY MOUTH EVERY DAY        Hypertensive Medications Protocol Failed - 4/21/2022 12:16 PM        Failed - CMP or BMP in past 12 months        Failed - Appointment in past 6 or next 3 months        Passed - GFR Non- > 50     Lab Results   Component Value Date    GFRNAA 99 04/16/2021                           Recent Outpatient Visits              1 week ago Hiatal hernia with GERD    [facility], [address] [facility], Aranda Mirza gimenez APRN    Office Visit    4 months ago     [facility] PROCEDURE    Nurse Only    12 months ago [facility] Shayan Nugent MD    Office Visit    1 year ago Routine physical examination    [facility], [address], Andrew Estrella MD    Office Visit    1 year ago Encounter for screening colonoscopy    [facility], [address], Aishwarya Theodore APRN    Virtual Phone E/M

## 2022-07-09 RX ORDER — FENOFIBRATE 145 MG/1
TABLET, COATED ORAL
Qty: 90 TABLET | Refills: 0 | Status: SHIPPED | OUTPATIENT
Start: 2022-07-09 | End: 2022-10-16

## 2022-07-13 NOTE — TELEPHONE ENCOUNTER
1st attempt - [employer] message sent for patient to contact the office to schedule an appointment; see notes below

## 2022-08-15 NOTE — TELEPHONE ENCOUNTER
Refill x 1 only. Call pt. Needs appt soon. Niacinamide Counseling: I recommended taking niacin or niacinamide, also know as vitamin B3, twice daily. Recent evidence suggests that taking vitamin B3 (500 mg twice daily) can reduce the risk of actinic keratoses and non-melanoma skin cancers. Side effects of vitamin B3 include flushing and headache.

## 2022-09-08 RX ORDER — AMLODIPINE BESYLATE 5 MG/1
5 TABLET ORAL DAILY
Qty: 90 TABLET | Refills: 1 | Status: SHIPPED | OUTPATIENT
Start: 2022-09-08

## 2022-09-08 NOTE — TELEPHONE ENCOUNTER
Please review. Protocol Failed or has No Protocol. Requested Prescriptions   Pending Prescriptions Disp Refills    AMLODIPINE 5 MG Oral Tab [Pharmacy Med Name: AMLODIPINE BESYLATE 5 MG TAB] 90 tablet 0     Sig: TAKE 1 TABLET BY MOUTH EVERY DAY - NEED APPT FOR MORE REFILLS        Hypertensive Medications Protocol Failed - 9/7/2022 12:17 AM        Failed - In person appointment in the past 12 or next 3 months       Recent Outpatient Visits              4 months ago Hiatal hernia with GERD    [facility], [address] [place] [address], [facility]    Office Visit    9 months ago     [facility] PROCEDURE    Nurse Only    1 year ago Darryl Rodríguez MD    Office Visit    1 year ago Routine physical examination    [facility], [address], Ranjit Estrella MD    Office Visit    1 year ago Encounter for screening colonoscopy    [facility], [address], Aishwarya Theodore, JON    Virtual Phone E/M                 Failed - Last BP reading less than 140/90     BP Readings from Last 1 Encounters:  04/13/22 : 159/88                Failed - CMP or BMP in past 6 months     No results found for this or any previous visit (from the past 4392 hour(s)).               Failed - In person appointment or virtual visit in the past 6 months       Recent Outpatient Visits              4 months ago Hiatal hernia with GERD    [facility], [address] [place] [address], [facility]    Office Visit    9 months ago     [facility] PROCEDURE    Nurse Only    1 year ago Darryl Rodríguez MD    Office Visit    1 year ago Routine physical examination    [facility], [address], Ranjit Estrella MD    Office Visit    1 year ago Encounter for screening colonoscopy    [facility], [address], Aishwarya Theodore, JON    Virtual Phone E/M                 Failed - GFR > 50     No results found for: [facility]                        Recent Outpatient Visits              4 months ago Hiatal hernia with GERD    [address], [address] [facility], Mirza Oconnor APRN    Office Visit    9 months ago     [facility] PROCEDURE    Nurse Only    1 year ago [facility] Melchor Knutson MD    Office Visit    1 year ago Routine physical examination    [address], [address], Yoly Estrella MD    Office Visit    1 year ago Encounter for screening colonoscopy    [address], [address], Adriana Theodore APRN    Virtual Phone E/M

## 2022-09-30 RX ORDER — PANTOPRAZOLE SODIUM 40 MG/1
TABLET, DELAYED RELEASE ORAL
Qty: 180 TABLET | Refills: 1 | Status: SHIPPED | OUTPATIENT
Start: 2022-09-30

## 2022-09-30 NOTE — TELEPHONE ENCOUNTER
Pantoprazole 40 mg/BID authorized for 90-day supply per rx protocol criteria met. Last seen in office by Cheryl Barba 4/13/22. Patient expressed sx management on ppi twice daily. Aprn instructed continuation of therapy.

## 2022-10-16 RX ORDER — FENOFIBRATE 145 MG/1
145 TABLET, COATED ORAL DAILY
Qty: 30 TABLET | Refills: 1 | Status: SHIPPED | OUTPATIENT
Start: 2022-10-16 | End: 2022-12-07

## 2022-10-16 NOTE — TELEPHONE ENCOUNTER
Please review. Protocol Failed or has No Protocol.     Requested Prescriptions   Pending Prescriptions Disp Refills    FENOFIBRATE 145 MG Oral Tab [Pharmacy Med Name: FENOFIBRATE 145 MG TABLET] 90 tablet 0     Sig: TAKE 1 TABLET BY MOUTH EVERY DAY        Cholesterol Medication Protocol Failed - 10/15/2022  8:26 AM        Failed - ALT in past 12 months        Failed - LDL in past 12 months        Failed - Last ALT < 80       Lab Results   Component Value Date    ALT 85 (H) 04/16/2021             Failed - Last LDL < 130     Lab Results   Component Value Date     (H) 04/16/2021               Failed - In person appointment or virtual visit in the past 12 mos or appointment in next 3 mos       Recent Outpatient Visits              6 months ago Hiatal hernia with GERD    [facility] [address] [place], [place] [address]Mirza APRN    Office Visit    10 months ago     [facility]    Nurse Only    1 year ago [facility] Jayla Quezada MD    Office Visit    1 year ago Routine physical examination    [facility] [address], Pablo Estrella MD    Office Visit    1 year ago Encounter for screening colonoscopy    [facility] [address], Eileen Theodore APRN    Virtual Phone E/M                           Recent Outpatient Visits              6 months ago Hiatal hernia with GERD    [facility] [address] [facility], [place] Mirza gimenez APRN    Office Visit    10 months ago     [facility]    Nurse Only    1 year ago Darryl Quezada MD    Office Visit    1 year ago Routine physical examination    [facility] [address], Pablo Estrella MD    Office Visit    1 year ago Encounter for screening colonoscopy    [facility] [address], Elysia Theodore APRN    Virtual Phone E/M

## 2023-01-13 RX ORDER — FENOFIBRATE 145 MG/1
145 TABLET, COATED ORAL DAILY
Qty: 30 TABLET | Refills: 1 | Status: SHIPPED | OUTPATIENT
Start: 2023-01-13

## 2023-08-25 RX ORDER — AMLODIPINE BESYLATE 5 MG/1
5 TABLET ORAL DAILY
Qty: 30 TABLET | Refills: 3 | Status: SHIPPED | OUTPATIENT
Start: 2023-08-25

## 2023-08-25 RX ORDER — FENOFIBRATE 145 MG/1
145 TABLET, COATED ORAL DAILY
Qty: 30 TABLET | Refills: 3 | Status: SHIPPED | OUTPATIENT
Start: 2023-08-25

## 2023-08-25 NOTE — TELEPHONE ENCOUNTER
Pt would like a refill on his amlodipine and fenofibrate medication. Per the patient he is out of medication. Pt did schedule an appointment for his physical with Dr. Ahmet Velazquez on 12-6-23. This was the first available. Pharmacy; [facility] (listed)     Current Outpatient Medications   Medication Sig Dispense Refill    amLODIPine 5 MG Oral Tab Take 1 tablet (5 mg total) by mouth daily. 30 tablet 1    fenofibrate 145 MG Oral Tab Take 1 tablet (145 mg total) by mouth daily.  30 tablet 1

## 2023-08-25 NOTE — TELEPHONE ENCOUNTER
Dr Milan Haney:   do you approve refills until December's appt? Last RX given 5/30/23 #30 with 1 refill.      LOV 2021

## 2023-09-01 RX ORDER — FENOFIBRATE 145 MG/1
145 TABLET, COATED ORAL DAILY
Qty: 100 TABLET | Refills: 0 | Status: SHIPPED | OUTPATIENT
Start: 2023-09-01

## 2023-09-01 RX ORDER — AMLODIPINE BESYLATE 5 MG/1
5 TABLET ORAL DAILY
Qty: 30 TABLET | Refills: 3 | OUTPATIENT
Start: 2023-09-01

## 2023-09-01 RX ORDER — AMLODIPINE BESYLATE 5 MG/1
5 TABLET ORAL DAILY
Qty: 100 TABLET | Refills: 0 | Status: SHIPPED | OUTPATIENT
Start: 2023-09-01

## 2023-10-11 ENCOUNTER — OFFICE VISIT (OUTPATIENT)
Dept: DERMATOLOGY CLINIC | Facility: CLINIC | Age: 58
End: 2023-10-11

## 2023-10-11 DIAGNOSIS — D22.9 MULTIPLE NEVI: ICD-10-CM

## 2023-10-11 DIAGNOSIS — L70.0 ACNE VULGARIS: Primary | ICD-10-CM

## 2023-10-11 DIAGNOSIS — D23.9 BENIGN NEOPLASM OF SKIN, UNSPECIFIED LOCATION: ICD-10-CM

## 2023-10-11 DIAGNOSIS — L57.0 AK (ACTINIC KERATOSIS): ICD-10-CM

## 2023-10-11 DIAGNOSIS — L81.4 LENTIGO: ICD-10-CM

## 2023-10-11 PROCEDURE — 99214 OFFICE O/P EST MOD 30 MIN: CPT | Performed by: DERMATOLOGY

## 2023-10-11 RX ORDER — DOXYCYCLINE HYCLATE 100 MG/1
100 CAPSULE ORAL 2 TIMES DAILY
Qty: 30 CAPSULE | Refills: 3 | Status: SHIPPED | OUTPATIENT
Start: 2023-10-11

## 2023-10-11 RX ORDER — METRONIDAZOLE 7.5 MG/G
GEL TOPICAL
Qty: 60 G | Refills: 5 | Status: SHIPPED | OUTPATIENT
Start: 2023-10-11

## 2023-10-22 NOTE — PROGRESS NOTES
Alireza Conner is a 62year old male. HPI:     CC:  Patient presents with:  Lesion: LOV 04/22. No hx of skin CA. Pt present  with a spot of concern on the tip of his nose. X 5 months, the past  2-3 weeks has been inflamed, tender feeling. Allergies:  Patient has no known allergies. HISTORY:    Past Medical History:   Diagnosis Date    Essential hypertension     High blood pressure     High cholesterol     Injury of tendon of right hand 1982    R hand tendon injury/  Hand surgery       Past Surgical History:   Procedure Laterality Date    CHOLECYSTECTOMY      COLONOSCOPY N/A 2/1/2022    Procedure: COLONOSCOPY;  Surgeon: Rosalie Montano MD;  Location: [address] [place] [address] ENDOSCOPY    HAND/FINGER SURGERY UNLISTED Right 1982    hand surgery. R hand tendon injury      Family History   Problem Relation Age of Onset    Melanoma Other         malignant melanoma, Relative? Social History     Socioeconomic History    Marital status:    Tobacco Use    Smoking status: Never    Smokeless tobacco: Never   Vaping Use    Vaping Use: Never used   Substance and Sexual Activity    Alcohol use: Yes     Comment: beer & wine, occasionally    Drug use: No    Sexual activity: Not Currently     Partners: Female   Other Topics Concern    Caffeine Concern No    Grew up on a farm No    History of tanning No    Outdoor occupation No    Reaction to local anesthetic No    Pt has a pacemaker No    Pt has a defibrillator No        Current Outpatient Medications   Medication Sig Dispense Refill    metroNIDAZOLE 0.75 % External Gel Use bid to nose and cheeks for rosacea 60 g 5    doxycycline 100 MG Oral Cap Take 1 capsule (100 mg total) by mouth 2 (two) times daily. 30 capsule 3    fenofibrate 145 MG Oral Tab Take 1 tablet (145 mg total) by mouth daily. 100 tablet 0    amLODIPine 5 MG Oral Tab Take 1 tablet (5 mg total) by mouth daily. 100 tablet 0    PANTOPRAZOLE 40 MG Oral Tab EC TAKE 1 TABLET BY MOUTH 2 TIMES DAILY BEFORE MEALS. 180 tablet 1     Allergies:   No Known Allergies    Past Medical History:   Diagnosis Date    Essential hypertension     High blood pressure     High cholesterol     Injury of tendon of right hand 1982    R hand tendon injury/  Hand surgery      Past Surgical History:   Procedure Laterality Date    CHOLECYSTECTOMY      COLONOSCOPY N/A 2/1/2022    Procedure: COLONOSCOPY;  Surgeon: Mohamud Pak MD;  Location: [address] [place] [address] ENDOSCOPY    HAND/FINGER SURGERY UNLISTED Right 1982    hand surgery.  R hand tendon injury     Social History    Socioeconomic History      Marital status:       Spouse name: Not on file      Number of children: Not on file      Years of education: Not on file      Highest education level: Not on file    Occupational History      Not on file    Tobacco Use      Smoking status: Never      Smokeless tobacco: Never    Vaping Use      Vaping Use: Never used    Substance and Sexual Activity      Alcohol use: Yes        Comment: beer & wine, occasionally      Drug use: No      Sexual activity: Not Currently        Partners: Female    Other Topics      Concerns:         Service: Not Asked        Blood Transfusions: Not Asked        Caffeine Concern: No        Occupational Exposure: Not Asked        Hobby Hazards: Not Asked        Sleep Concern: Not Asked        Stress Concern: Not Asked        Weight Concern: Not Asked        Special Diet: Not Asked        Back Care: Not Asked        Exercise: Not Asked        Bike Helmet: Not Asked        Seat Belt: Not Asked        Self-Exams: Not Asked        Grew up on a farm: No        History of tanning: No        Outdoor occupation: No        Reaction to local anesthetic: No        Pt has a pacemaker: No        Pt has a defibrillator: No    Social History Narrative      Not on file    Social Determinants of Health  Financial Resource Strain: Not on file  Food Insecurity: Not on file  Transportation Needs: Not on file  Physical Activity: Not on file  Stress: Not on file  Social Connections: Not on file  Housing Stability: Not on file  Family History   Problem Relation Age of Onset    Melanoma Other         malignant melanoma, Relative? There were no vitals filed for this visit. HPI:    Patient presents with:  Lesion: LOV 04/22. No hx of skin CA. Pt present  with a spot of concern on the tip of his nose. X 5 months, the past  2-3 weeks has been inflamed, tender feeling. Follow-up new issue lesion on nasal tip, left cheek  Redness in the background no topicals  Has no personal or family history of skin cancer  Patient presents with concerns above. Patient has been in their usual state of health. History, medications, allergies reviewed as noted. ROS:  Denies any other systemic complaints. No new or changeing lesions other than noted above. No fevers, chills, night sweats, unusual sun sensitivity. No other skin complaints. History, medications, allergies reviewed as noted. Physical Examination:     Well-developed well-nourished patient alert oriented in no acute distress. Exam total-body performed, including scalp, head, neck, face,nails, hair, external eyes, including conjunctival mucosa, eyelids, lips external ears, back, chest,/ breasts, axillae,  abdomen, arms, legs, palms. Multiple light to medium brown, well marginated, uniformly pigmented, macules and papules 6 mm and less scattered on exam. pigmented lesions examined with dermoscopy benign-appearing patterns. Waxy tannish keratotic papules scattered, cherry-red vascular papules scattered. See map today's date for lesions noted . Otherwise remarkable for lesions as noted on map. See details of examination  See Assessment /Plan for additional history and physical exam also:    Assessment / plan:    No orders of the defined types were placed in this encounter.       Meds & Refills for this Visit:  Requested Prescriptions     Signed Prescriptions Disp Refills metroNIDAZOLE 0.75 % External Gel 60 g 5     Sig: Use bid to nose and cheeks for rosacea    doxycycline 100 MG Oral Cap 30 capsule 3     Sig: Take 1 capsule (100 mg total) by mouth 2 (two) times daily. Acne vulgaris  (primary encounter diagnosis)  Multiple nevi  Lentigo  Benign neoplasm of skin, unspecified location    See details on map. Remarkable for:    Background erythema over the nose cheeks infraorbital area with larger inflammatory crusted nodule at left lower cheek, nasal tip with more inflammatory papular nodules  . Rosacea. Meds in grid. Skin care instructions reviewed. Pathophysiology reviewed. Chronic recurrent nature discussed. Patient will let us know how they are doing over the next several weeks. Await clinical response to above therapy. MetroGel, Doxy over the next couple of weeks, taper off. Continue sun protection    Actinic Keratoses. Precancerous nature discussed. Sun protection, sunscreen/ blocks encouraged . Monitoring for new lesions. Sun damage additional recurrent and new actinic keratoses, skin cancers may occur in areas of prior actinic keratoses, related to past sun exposure to minimize current sun exposure. Sunscreen applied consistently regularly, reapplication and sun protection while driving recommended. Monitor area at left cheek consider imiquimod recheck in 2 months    Moderate sun damage head neck arms hands. Scattered nevi no other suspicious lesions presently encourage sunscreen sun protection    No other susupicious lesions on todays  exam.      Please refer to map for specific lesions. See additional diagnoses. Pros cons of various therapies, risks benefits discussed. Pathophysiology discussed with patient. Therapeutic options reviewed. See  Medications in grid. Instructions reviewed at length. Benign nevi, seborrheic  keratoses, cherry angiomas:  Reassurance regarding other benign skin lesions. Signs and symptoms of skin cancer, ABCDE's of melanoma discussed with patient. Sunscreen use, sun protection, self exams reviewed. Followup as noted RTC routine checkup 6 mos - one year or p.r.n. The patient indicates understanding of these issues and agrees to the plan. The patient is asked to return as noted in follow-up/ above. This note was generated using Dragon voice recognition software. Please contact me regarding any confusion resulting from errors in recognition. Encounter Times   Including precharting, reviewing chart, prior notes obtaining history: 10 minutes, medical exam :10 minutes, notes on body map, plan, counseling 10minutes My total time spent caring for the patient on the day of the encounter: 30 minutes    Note to patient and family: The [facility] makes medical notes like these available to patients. However, be advised this is a medical document. It is intended as [id number] communication and monitoring of a patient's care needs. It is written in medical language and may contain abbreviations or verbiage that are unfamiliar. It may appear blunt or direct. Medical documents are intended to carry relevant information, facts as evident and the clinical opinion of the practitioner.

## 2023-11-28 RX ORDER — PANTOPRAZOLE SODIUM 40 MG/1
40 TABLET, DELAYED RELEASE ORAL
Qty: 180 TABLET | Refills: 1 | Status: SHIPPED | OUTPATIENT
Start: 2023-11-28

## 2023-11-28 NOTE — TELEPHONE ENCOUNTER
Requested Prescriptions     Pending Prescriptions Disp Refills    PANTOPRAZOLE 40 MG Oral Tab EC [Pharmacy Med Name: PANTOPRAZOLE SOD DR 40 MG TAB] 180 tablet 1     Sig: TAKE 1 TABLET BY MOUTH TWICE A DAY BEFORE MEALS     LOV: 4/13/22    Last refill: 9/30/22      Dr. Rogelio Hendrix -please review and sign pended order if agreeable.

## 2023-12-18 RX ORDER — FENOFIBRATE 145 MG/1
145 TABLET, COATED ORAL DAILY
Qty: 100 TABLET | Refills: 0 | OUTPATIENT
Start: 2023-12-18

## 2024-03-24 RX ORDER — AMLODIPINE BESYLATE 5 MG/1
5 TABLET ORAL DAILY
Qty: 100 TABLET | Refills: 0 | Status: CANCELLED | OUTPATIENT
Start: 2024-03-24

## 2024-03-24 RX ORDER — FENOFIBRATE 145 MG/1
145 TABLET, COATED ORAL DAILY
Qty: 100 TABLET | Refills: 0 | Status: CANCELLED | OUTPATIENT
Start: 2024-03-24

## 2024-03-25 ENCOUNTER — OFFICE VISIT (OUTPATIENT)
Dept: INTERNAL MEDICINE CLINIC | Facility: CLINIC | Age: 59
End: 2024-03-25
Payer: COMMERCIAL

## 2024-03-25 VITALS
HEART RATE: 104 BPM | SYSTOLIC BLOOD PRESSURE: 154 MMHG | DIASTOLIC BLOOD PRESSURE: 84 MMHG | WEIGHT: 289.81 LBS | HEIGHT: 66 IN | BODY MASS INDEX: 46.58 KG/M2

## 2024-03-25 DIAGNOSIS — Z00.00 ANNUAL PHYSICAL EXAM: Primary | ICD-10-CM

## 2024-03-25 DIAGNOSIS — I10 ESSENTIAL HYPERTENSION: ICD-10-CM

## 2024-03-25 DIAGNOSIS — Z86.010 HX OF ADENOMATOUS COLONIC POLYPS: ICD-10-CM

## 2024-03-25 DIAGNOSIS — E78.5 DYSLIPIDEMIA: ICD-10-CM

## 2024-03-25 PROBLEM — K21.9 GERD (GASTROESOPHAGEAL REFLUX DISEASE): Status: ACTIVE | Noted: 2024-03-25

## 2024-03-25 PROBLEM — Z86.0101 HX OF ADENOMATOUS COLONIC POLYPS: Status: ACTIVE | Noted: 2022-02-01

## 2024-03-25 PROCEDURE — 3079F DIAST BP 80-89 MM HG: CPT | Performed by: INTERNAL MEDICINE

## 2024-03-25 PROCEDURE — 3008F BODY MASS INDEX DOCD: CPT | Performed by: INTERNAL MEDICINE

## 2024-03-25 PROCEDURE — 3077F SYST BP >= 140 MM HG: CPT | Performed by: INTERNAL MEDICINE

## 2024-03-25 PROCEDURE — 99396 PREV VISIT EST AGE 40-64: CPT | Performed by: INTERNAL MEDICINE

## 2024-03-25 RX ORDER — AMLODIPINE BESYLATE 5 MG/1
5 TABLET ORAL DAILY
Qty: 90 TABLET | Refills: 1 | Status: CANCELLED | OUTPATIENT
Start: 2024-03-25

## 2024-03-25 RX ORDER — AMLODIPINE BESYLATE 10 MG/1
10 TABLET ORAL DAILY
Qty: 90 TABLET | Refills: 0 | Status: SHIPPED | OUTPATIENT
Start: 2024-03-25

## 2024-03-25 RX ORDER — FENOFIBRATE 145 MG/1
145 TABLET, COATED ORAL DAILY
Qty: 90 TABLET | Refills: 1 | Status: SHIPPED | OUTPATIENT
Start: 2024-03-25

## 2024-03-25 NOTE — H&P
Hector Morin is a 58 year old male who presents for a complete physical exam.   HPI:   He usually follows with a different PCP.  Last office visit was April 2021.  He has been feeling well and has no specific issues for discussion today.  He does request refills of medications.    Past medical and past surgical histories reviewed, as outlined below.  Medications reviewed, as listed.  Compliant, although he ran out of fenofibrate 3 days ago.  Medication allergies reviewed-none    No out of office BP monitoring.  He tries to watch his diet but does not regularly exercise.  Weight up 16 pounds since office visit April 2021.  Up-to-date on immunizations, including tetanus booster a few years ago.  Next colonoscopy due 2027.    Wt Readings from Last 4 Encounters:   03/25/24 289 lb 12.8 oz (131.5 kg)   04/13/22 278 lb (126.1 kg)   02/01/22 250 lb (113.4 kg)   04/12/21 273 lb (123.8 kg)     Body mass index is 46.77 kg/m².     Current Outpatient Medications   Medication Sig Dispense Refill    pantoprazole 40 MG Oral Tab EC Take 1 tablet (40 mg total) by mouth 2 (two) times daily before meals. (Patient taking differently: Take 1 tablet (40 mg total) by mouth every morning before breakfast.) 180 tablet 1    fenofibrate 145 MG Oral Tab Take 1 tablet (145 mg total) by mouth daily. 100 tablet 0    amLODIPine 5 MG Oral Tab Take 1 tablet (5 mg total) by mouth daily. 100 tablet 0     No Known Allergies   Past Medical History:   Diagnosis Date    Dyslipidemia     Essential hypertension     GERD (gastroesophageal reflux disease)     Hx of adenomatous colonic polyps 02/2022      Past Surgical History:   Procedure Laterality Date    COLONOSCOPY N/A 02/01/2022    Procedure: COLONOSCOPY;  Surgeon: India Cristobal MD;  Location: [facility] ENDOSCOPY    HAND/FINGER SURGERY UNLISTED Right 1982    hand surgery. R hand tendon injury    LAPAROSCOPIC CHOLECYSTECTOMY  03/21/2021      Family History   Problem Relation Age of Onset    Melanoma  Other         malignant melanoma, Relative?      Social History:  Social History     Socioeconomic History    Marital status:    Tobacco Use    Smoking status: Never    Smokeless tobacco: Never   Vaping Use    Vaping Use: Never used   Substance and Sexual Activity    Alcohol use: Yes     Comment: Occasional    Drug use: No    Sexual activity: Not Currently     Partners: Female   Other Topics Concern    Caffeine Concern No    Grew up on a farm No    History of tanning No    Outdoor occupation No    Reaction to local anesthetic No    Pt has a pacemaker No    Pt has a defibrillator No           REVIEW OF SYSTEMS:   GENERAL: No fever  LUNGS: No cough wheezing or shortness of breath  CARDIAC: No lightheadedness palpitations or chest pain  GI: No anorexia heartburn dysphagia nausea vomiting abdominal pain diarrhea constipation or rectal bleeding  : No urinary frequency dysuria or hematuria  MUSCULOSKELETAL: No leg swelling  NEURO: No headaches    EXAM:   GENERAL: Pleasant male appearing well in no distress  /84   Pulse 104   Ht 5' 6\" (1.676 m)   Wt 289 lb 12.8 oz (131.5 kg)   BMI 46.77 kg/m²   HEENT: Anicteric, conjunctiva pink, oropharynx normal  NECK: Supple without mass or thyromegaly  NODES: No peripheral adenopathy  LUNGS: Resonant to percussion and clear to auscultation  CARDIAC: Rhythm regular S1 S2 normal without murmur or edema  ABDOMEN: Obese.  Bowel sounds normal soft nontender without mass or hepatosplenomegaly  EXTREMITIES: Normal without cyanosis or clubbing  PULSES: 2+ bilateral dorsalis pedis and posterior tibial  NEURO: Reflexes 1-2+ bilaterally and symmetric     ASSESSMENT AND PLAN:   Hector Morin is a 58 year old male who presents for a complete physical exam.     1. Annual physical exam  Check CMP CBC glycohemoglobin lipid profile screening PSA and TSH with reflex T4.  Order sent  Increase amlodipine to 10 mg daily.  Prescription sent to pharmacy  Continue fenofibrate.   Refill sent  Return visit in 1 month for blood pressure check    2. Essential hypertension  BP elevated today  Increase amlodipine as above with follow-up visit in 1 month    3. Dyslipidemia  Continue fenofibrate and await labs    4. Hx of adenomatous colonic polyps  Up-to-date on colonoscopy      Mayank Alexis MD  3/25/2024  10:46 AM

## 2024-03-25 NOTE — PATIENT INSTRUCTIONS
Your blood pressure today was high at 154/84  Come in soon when you can for blood tests  Increase amlodipine to 10 mg 1 tablet daily  Continue fenofibrate  Return visit in 1 month for a blood pressure check

## 2024-03-28 ENCOUNTER — LAB ENCOUNTER (OUTPATIENT)
Dept: LAB | Facility: HOSPITAL | Age: 59
End: 2024-03-28
Attending: INTERNAL MEDICINE
Payer: COMMERCIAL

## 2024-03-28 DIAGNOSIS — Z00.00 ANNUAL PHYSICAL EXAM: ICD-10-CM

## 2024-03-28 LAB
ALBUMIN SERPL-MCNC: 4.7 G/DL (ref 3.2–4.8)
ALBUMIN/GLOB SERPL: 1.9 {RATIO} (ref 1–2)
ALP LIVER SERPL-CCNC: 75 U/L
ALT SERPL-CCNC: 71 U/L
ANION GAP SERPL CALC-SCNC: 8 MMOL/L (ref 0–18)
AST SERPL-CCNC: 53 U/L (ref ?–34)
BILIRUB SERPL-MCNC: 0.6 MG/DL (ref 0.3–1.2)
BUN BLD-MCNC: 18 MG/DL (ref 9–23)
BUN/CREAT SERPL: 19.1 (ref 10–20)
CALCIUM BLD-MCNC: 10.3 MG/DL (ref 8.7–10.4)
CHLORIDE SERPL-SCNC: 105 MMOL/L (ref 98–112)
CHOLEST SERPL-MCNC: 204 MG/DL (ref ?–200)
CO2 SERPL-SCNC: 29 MMOL/L (ref 21–32)
COMPLEXED PSA SERPL-MCNC: 0.67 NG/ML (ref ?–4)
CREAT BLD-MCNC: 0.94 MG/DL
DEPRECATED RDW RBC AUTO: 39.8 FL (ref 35.1–46.3)
EGFRCR SERPLBLD CKD-EPI 2021: 94 ML/MIN/1.73M2 (ref 60–?)
ERYTHROCYTE [DISTWIDTH] IN BLOOD BY AUTOMATED COUNT: 12.2 % (ref 11–15)
EST. AVERAGE GLUCOSE BLD GHB EST-MCNC: 123 MG/DL (ref 68–126)
FASTING PATIENT LIPID ANSWER: YES
FASTING STATUS PATIENT QL REPORTED: YES
GLOBULIN PLAS-MCNC: 2.5 G/DL (ref 2.8–4.4)
GLUCOSE BLD-MCNC: 108 MG/DL (ref 70–99)
HBA1C MFR BLD: 5.9 % (ref ?–5.7)
HCT VFR BLD AUTO: 44.5 %
HDLC SERPL-MCNC: 37 MG/DL (ref 40–59)
HGB BLD-MCNC: 15.6 G/DL
LDLC SERPL CALC-MCNC: 141 MG/DL (ref ?–100)
MCH RBC QN AUTO: 31.1 PG (ref 26–34)
MCHC RBC AUTO-ENTMCNC: 35.1 G/DL (ref 31–37)
MCV RBC AUTO: 88.6 FL
NONHDLC SERPL-MCNC: 167 MG/DL (ref ?–130)
OSMOLALITY SERPL CALC.SUM OF ELEC: 296 MOSM/KG (ref 275–295)
PLATELET # BLD AUTO: 254 10(3)UL (ref 150–450)
POTASSIUM SERPL-SCNC: 3.9 MMOL/L (ref 3.5–5.1)
PROT SERPL-MCNC: 7.2 G/DL (ref 5.7–8.2)
RBC # BLD AUTO: 5.02 X10(6)UL
SODIUM SERPL-SCNC: 142 MMOL/L (ref 136–145)
TRIGL SERPL-MCNC: 143 MG/DL (ref 30–149)
TSI SER-ACNC: 3.98 MIU/ML (ref 0.55–4.78)
VLDLC SERPL CALC-MCNC: 27 MG/DL (ref 0–30)
WBC # BLD AUTO: 6.2 X10(3) UL (ref 4–11)

## 2024-03-28 PROCEDURE — 85027 COMPLETE CBC AUTOMATED: CPT

## 2024-03-28 PROCEDURE — 84443 ASSAY THYROID STIM HORMONE: CPT

## 2024-03-28 PROCEDURE — 80061 LIPID PANEL: CPT

## 2024-03-28 PROCEDURE — 80053 COMPREHEN METABOLIC PANEL: CPT

## 2024-03-28 PROCEDURE — 83036 HEMOGLOBIN GLYCOSYLATED A1C: CPT

## 2024-03-28 PROCEDURE — 36415 COLL VENOUS BLD VENIPUNCTURE: CPT

## 2024-06-06 RX ORDER — PANTOPRAZOLE SODIUM 40 MG/1
40 TABLET, DELAYED RELEASE ORAL
Qty: 90 TABLET | Refills: 0 | Status: SHIPPED | OUTPATIENT
Start: 2024-06-06 | End: 2024-09-04

## 2024-06-06 NOTE — TELEPHONE ENCOUNTER
Left a message for the patient to call [phone] to make the follow up appointment with Dr Melendrez or Amparo

## 2024-06-06 NOTE — TELEPHONE ENCOUNTER
Requested Prescriptions     Pending Prescriptions Disp Refills    PANTOPRAZOLE 40 MG Oral Tab EC [Pharmacy Med Name: PANTOPRAZOLE SOD DR 40 MG TAB] 180 tablet 1     Sig: TAKE 1 TABLET BY MOUTH 2 TIMES DAILY BEFORE MEALS.        LOV    4/13/2022       LR   11/28/2023    Routed to Office on call

## 2024-06-06 NOTE — TELEPHONE ENCOUNTER
GI staff: please have patient make appointment to see me or Jazzy in clinic in the next 3 months. This medication has been refilled for now, but we cannot provide future refills on medications unless patient is seen in clinic for follow-up and monitoring. Thank you.

## 2024-06-22 RX ORDER — AMLODIPINE BESYLATE 10 MG/1
10 TABLET ORAL DAILY
Qty: 90 TABLET | Refills: 0 | Status: SHIPPED | OUTPATIENT
Start: 2024-06-22

## 2024-06-22 NOTE — TELEPHONE ENCOUNTER
To reception staff, pls call patient for appt.   Also [employer] message sent to patient   Thanks     No future appointments.      Protocol Failed/ No Protocol    Requested Prescriptions   Pending Prescriptions Disp Refills    AMLODIPINE 10 MG Oral Tab [Pharmacy Med Name: AMLODIPINE BESYLATE 10 MG TAB] 90 tablet 0     Sig: TAKE 1 TABLET BY MOUTH EVERY DAY       Hypertension Medications Protocol Failed - 6/20/2024 12:18 AM        Failed - Last BP reading less than 140/90     BP Readings from Last 1 Encounters:   03/25/24 154/84               Passed - CMP or BMP in past 12 months        Passed - In person appointment or virtual visit in the past 12 mos or appointment in next 3 mos     Recent Outpatient Visits              2 months ago Annual physical exam    [facility], Mayank Guaman MD    Office Visit    8 months ago Acne vulgaris    [facility], Angela Gomez MD    Office Visit    2 years ago Hiatal hernia with GERD    [facility], Emily Thomson APRN    [facility]    [facility]    3 years ago Inflamed acrochordon    [facility], [place], Kathryn, MD    Office Visit                      Passed - EGFRCR or GFRNAA > 50     GFR Evaluation  EGFRCR: 94 , resulted on 3/28/2024                 Recent Outpatient Visits              2 months ago Annual physical exam    [facility], Mayank Guaman MD    Office Visit    8 months ago Acne vulgaris    [facility], Angela Gomez MD    Office Visit    2 years ago Hiatal hernia with GERD    [facility], Emily Thomson APRN    Office Visit    2 years ago     [facility]  [place]    Nurse Only    3 years ago [facility] [address] [place] Angela Edmonds MD    Office Visit

## 2024-06-24 NOTE — TELEPHONE ENCOUNTER
Spoke, with the patient and informed him of the message below. Patient stated that Dr. Alexis is his new PCP. Patient did schedule an appointment to see Dr. Alexis on 7-16-24.

## 2024-07-16 ENCOUNTER — OFFICE VISIT (OUTPATIENT)
Dept: INTERNAL MEDICINE CLINIC | Facility: CLINIC | Age: 59
End: 2024-07-16

## 2024-07-16 VITALS
WEIGHT: 295 LBS | DIASTOLIC BLOOD PRESSURE: 68 MMHG | BODY MASS INDEX: 47.41 KG/M2 | SYSTOLIC BLOOD PRESSURE: 144 MMHG | HEART RATE: 78 BPM | HEIGHT: 66 IN

## 2024-07-16 DIAGNOSIS — I10 ESSENTIAL HYPERTENSION: Primary | ICD-10-CM

## 2024-07-16 PROCEDURE — 3078F DIAST BP <80 MM HG: CPT | Performed by: INTERNAL MEDICINE

## 2024-07-16 PROCEDURE — 3008F BODY MASS INDEX DOCD: CPT | Performed by: INTERNAL MEDICINE

## 2024-07-16 PROCEDURE — 99213 OFFICE O/P EST LOW 20 MIN: CPT | Performed by: INTERNAL MEDICINE

## 2024-07-16 PROCEDURE — 3077F SYST BP >= 140 MM HG: CPT | Performed by: INTERNAL MEDICINE

## 2024-07-16 NOTE — PATIENT INSTRUCTIONS
Your blood pressure today was better but still high at 144/68  Continue your current medications and try to follow a healthy diet, exercise regularly and lose weight  Return visit in 1 month for a blood pressure check

## 2024-07-16 NOTE — PROGRESS NOTES
Hector Morin is a 59 year old male.   Chief Complaint   Patient presents with    Follow - Up     F/u HTN      HPI:   Hector presents this morning for follow-up of hypertension.  At his physical with me in March, dose of amlodipine was increased because of elevated blood pressure.    Recent BP checks at home typically 130s/80s.  Diet unchanged and he has not been exercising recently.  Weight up 6 pounds since physical in March.  No headaches.  No lightheadedness or dizziness.  No palpitations or chest pain.  No shortness of breath.    Medications reviewed, as listed below.  Compliant.  Current Outpatient Medications   Medication Sig Dispense Refill    amLODIPine 10 MG Oral Tab Take 1 tablet (10 mg total) by mouth daily. 90 tablet 0    pantoprazole 40 MG Oral Tab EC Take 1 tablet (40 mg total) by mouth every morning before breakfast. 90 tablet 0    fenofibrate 145 MG Oral Tab Take 1 tablet (145 mg total) by mouth daily. 90 tablet 1     No Known Allergies   Past Medical History:    Dyslipidemia    Essential hypertension    GERD (gastroesophageal reflux disease)    Hx of adenomatous colonic polyps     Past Surgical History:   Procedure Laterality Date    Colonoscopy N/A 02/01/2022    Procedure: COLONOSCOPY;  Surgeon: India Cristobal MD;  Location: [facility] ENDOSCOPY    Hand/finger surgery unlisted Right 1982    hand surgery. R hand tendon injury    Laparoscopic cholecystectomy  03/21/2021      Social History:  Social History     Socioeconomic History    Marital status:    Tobacco Use    Smoking status: Never     Passive exposure: Never    Smokeless tobacco: Never   Vaping Use    Vaping status: Never Used   Substance and Sexual Activity    Alcohol use: Yes     Comment: Occasional    Drug use: No    Sexual activity: Not Currently     Partners: Female   Other Topics Concern    Caffeine Concern No    Grew up on a farm No    History of tanning No    Outdoor occupation No    Reaction to local anesthetic No    Pt  has a pacemaker No    Pt has a defibrillator No        EXAM:   GENERAL: Pleasant male appearing well in no distress  /68   Pulse 78   Ht 5' 6\" (1.676 m)   Wt 295 lb (133.8 kg)   BMI 47.61 kg/m²   LUNGS: Resonant to percussion and clear to auscultation  CARDIAC: Rhythm regular S1 S2 normal without murmur   ABDOMEN: Bowel sounds normal soft nontender       ASSESSMENT AND PLAN:   1. Essential hypertension  Improved but BP remains above goal.  On maximal dose of amlodipine.  Discussed and recommended adding second antihypertensive medication but he wishes to defer for now.  Discussed and recommended healthier diet to include sodium and calorie restriction, regular exercise and attempts at progressive weight loss  Recommend return visit in 1 month for blood pressure check.  He is agreeable.      The patient indicates understanding of these issues and agrees to the plan.  The patient is asked to return in 1 month.    Mayank Alexis MD  7/16/2024  10:43 AM

## 2024-09-13 NOTE — TELEPHONE ENCOUNTER
Requested Prescriptions     Pending Prescriptions Disp Refills    PANTOPRAZOLE 40 MG Oral Tab EC [Pharmacy Med Name: PANTOPRAZOLE SOD DR 40 MG TAB] 90 tablet 0     Sig: TAKE 1 TABLET BY MOUTH EVERY DAY IN THE MORNING BEFORE BREAKFAST         LOV   4/13/2022      LR    6/6/2024    SC    Unable to provide refills on medication until patient is seen in office per Dr Melendrez's note. TE 6/6/2024    Called patient to schedule F/U appointment. Left message to call back..

## 2024-09-15 DIAGNOSIS — E78.5 DYSLIPIDEMIA: ICD-10-CM

## 2024-09-16 NOTE — TELEPHONE ENCOUNTER
2nd attempt trying to contact patient to schedule in office follow up appointment. No answer. Left message to call back.

## 2024-09-18 RX ORDER — FENOFIBRATE 145 MG/1
145 TABLET, COATED ORAL DAILY
Qty: 90 TABLET | Refills: 3 | Status: SHIPPED | OUTPATIENT
Start: 2024-09-18

## 2024-09-18 RX ORDER — PANTOPRAZOLE SODIUM 40 MG/1
40 TABLET, DELAYED RELEASE ORAL
Qty: 90 TABLET | Refills: 0 | OUTPATIENT
Start: 2024-09-18

## 2024-09-19 NOTE — TELEPHONE ENCOUNTER
Refill passed per [facility] protocol.    Requested Prescriptions   Pending Prescriptions Disp Refills    FENOFIBRATE 145 MG Oral Tab [Pharmacy Med Name: FENOFIBRATE 145 MG TABLET] 90 tablet 1     Sig: TAKE 1 TABLET BY MOUTH EVERY DAY       Cholesterol Medication Protocol Passed - 9/15/2024  7:20 AM        Passed - ALT < 80     Lab Results   Component Value Date    ALT 71 (H) 03/28/2024             Passed - ALT resulted within past year        Passed - Lipid panel within past 12 months     Lab Results   Component Value Date    CHOLEST 204 (H) 03/28/2024    TRIG 143 03/28/2024    HDL 37 (L) 03/28/2024     (H) 03/28/2024    VLDL 27 03/28/2024    NONHDLC 167 (H) 03/28/2024             Passed - In person appointment or virtual visit in the past 12 mos or appointment in next 3 mos     Recent Outpatient Visits              2 months ago [facility]ential [facility]    [facility] Visit    5 months ago [facility]    [facility]    11 months ago [facility]    [facility] Visit    2 years ago Hiatal hernia with GERD    [facility] Emily Fitzpatrick APRN    Office Visit    2 years ago     [facility]    Nurse Only                           Recent Outpatient Visits              2 months ago Essential hypertension    [facility]    [facility] Visit    5 months ago [facility] physical exam    [facility]    [facility] Visit    11 months ago [facility] vulgaris    [facility]    Office Visit    2 years ago Hiatal  hernia with GERD    [facility] [place] Emily Fitzpatrick APRN    Office Visit    2 years ago     [facility], [facility] [place]    Nurse Only

## 2024-09-21 RX ORDER — AMLODIPINE BESYLATE 10 MG/1
10 TABLET ORAL DAILY
Qty: 90 TABLET | Refills: 3 | OUTPATIENT
Start: 2024-09-21

## 2024-09-21 NOTE — TELEPHONE ENCOUNTER
Please review. Protocol Failed; No Protocol    Requested Prescriptions   Pending Prescriptions Disp Refills    AMLODIPINE 10 MG Oral Tab [Pharmacy Med Name: AMLODIPINE BESYLATE 10 MG TAB] 90 tablet 0     Sig: TAKE 1 TABLET BY MOUTH EVERY DAY       Hypertension Medications Protocol Failed - 9/17/2024  9:19 AM        Failed - Last BP reading less than 140/90     BP Readings from Last 1 Encounters:   07/16/24 144/68               Passed - CMP or BMP in past 12 months        Passed - In person appointment or virtual visit in the past 12 mos or appointment in next 3 mos     Recent Outpatient Visits              2 months ago Essential hypertension    [facility]    Office Visit    6 months ago [facility]    [facility]    11 months ago [facility]ne vulgaris    [facility]    [facility] Visit    2 years ago Hiatal hernia with GERD    [facility], Emily Thomson APRN    Office Visit    2 years ago     [facility]    Nurse Only                      Passed - EGFRCR or GFRNAA > 50     GFR Evaluation  EGFRCR: 94 , resulted on 3/28/2024                   Recent Outpatient Visits              2 months ago Essential hypertension    [facility]    [facility] Visit    6 months ago [facility] physical exam    [facility]    [facility] Visit    11 months ago Acne vulgaris    [facility]    Office Visit    2 years ago Hiatal hernia with GERD    [facility], Emily Thomson APRN    Office Visit    2 years ago     [place]  [facility], [facility], [place]    Nurse Only

## 2024-09-27 RX ORDER — AMLODIPINE BESYLATE 10 MG/1
10 TABLET ORAL DAILY
Qty: 90 TABLET | Refills: 0 | Status: SHIPPED | OUTPATIENT
Start: 2024-09-27 | End: 2024-12-21

## 2024-09-27 NOTE — TELEPHONE ENCOUNTER
Please Review. Protocol Failed; No Protocol   BP Readings from Last 1 Encounters:   07/16/24 144/68     Requested Prescriptions   Pending Prescriptions Disp Refills    AMLODIPINE 10 MG Oral Tab [Pharmacy Med Name: AMLODIPINE BESYLATE 10 MG TAB] 90 tablet 0     Sig: TAKE 1 TABLET BY MOUTH EVERY DAY       Hypertension Medications Protocol Failed - 9/22/2024  4:59 PM        Failed - Last BP reading less than 140/90     BP Readings from Last 1 Encounters:   07/16/24 144/68               Passed - CMP or BMP in past 12 months        Passed - In person appointment or virtual visit in the past 12 mos or appointment in next 3 mos     Recent Outpatient Visits              2 months ago Essential hypertension    [facility]    Office Visit    6 months ago Annual physical exam    [facility]    [facility] Visit    11 months ago Acne vulgaris    [facility], Angela Gomez MD    Office Visit    2 years ago Hiatal hernia with GERD    [facility], Emily Thomson APRN    Office Visit    2 years ago     [facility]    Nurse Only                      Passed - EGFRCR or GFRNAA > 50     GFR Evaluation  EGFRCR: 94 , resulted on 3/28/2024                   Recent Outpatient Visits              2 months ago Essential hypertension    [facility]    [facility] Visit    6 months ago Annual physical exam    [facility]    Office Visit    11 months ago Acne vulgaris    [facility]    Office Visit    2 years ago Hiatal hernia with GERD    [facility], Darling Fitzpatrick  JON Diaz    Office Visit    2 years ago     [facility], [facility], [place]    Nurse Only

## 2024-10-01 NOTE — TELEPHONE ENCOUNTER
2nd attempt - left message to call back per below; schedule patient if call returned, thank you   22

## 2024-11-29 ENCOUNTER — OFFICE VISIT (OUTPATIENT)
Dept: OTOLARYNGOLOGY | Facility: CLINIC | Age: 59
End: 2024-11-29

## 2024-11-29 ENCOUNTER — OFFICE VISIT (OUTPATIENT)
Dept: AUDIOLOGY | Facility: CLINIC | Age: 59
End: 2024-11-29

## 2024-11-29 VITALS — BODY MASS INDEX: 46 KG/M2 | WEIGHT: 285 LBS

## 2024-11-29 DIAGNOSIS — H90.3 ASYMMETRIC SNHL (SENSORINEURAL HEARING LOSS): Primary | ICD-10-CM

## 2024-11-29 DIAGNOSIS — H91.90 HEARING LOSS, UNSPECIFIED HEARING LOSS TYPE, UNSPECIFIED LATERALITY: Primary | ICD-10-CM

## 2024-11-29 DIAGNOSIS — G47.33 OSA (OBSTRUCTIVE SLEEP APNEA): ICD-10-CM

## 2024-11-29 PROCEDURE — 92556 SPEECH AUDIOMETRY COMPLETE: CPT | Performed by: AUDIOLOGIST

## 2024-11-29 PROCEDURE — 92552 PURE TONE AUDIOMETRY AIR: CPT | Performed by: AUDIOLOGIST

## 2024-11-29 PROCEDURE — 99203 OFFICE O/P NEW LOW 30 MIN: CPT | Performed by: OTOLARYNGOLOGY

## 2024-11-29 PROCEDURE — 92567 TYMPANOMETRY: CPT | Performed by: AUDIOLOGIST

## 2024-11-29 NOTE — PROGRESS NOTES
Hector Morin is a 59 year old male.    Chief Complaint   Patient presents with    Snoring     Patient is here for snoring and difficult to sleep reports dry cough.x 1 year.    Hearing Loss     Patient is here for difficult to hear by wife reports would like to check his hearing        HISTORY OF PRESENT ILLNESS  She presents with 2 complaints.  Sent by his wife due to the severity of his snoring.  She still sleeps in the same room with him but does state that she has noted moments where he appears to stop breathing.  She also notes that he has significant difficulty hearing when there is any noise present.  He denies any significant hearing loss but does state that there are times that he is in a crowd or in a loud environment where he does have some difficulty hearing.  Based on his complaints of hearing loss audiogram was performed today based on his symptoms and complaints with finding of normal hearing at the lowest and mid frequencies see moderate loss on the right and a mild loss on the left at about 8000 Hz.  Normal temp and speech discrimination scores.  Does complain of dry mouth when sleeping states that his sleep is reasonably good in general but does note that he can be fit early in the mornings.      Social History     Socioeconomic History    Marital status:    Tobacco Use    Smoking status: Never     Passive exposure: Never    Smokeless tobacco: Never   Vaping Use    Vaping status: Never Used   Substance and Sexual Activity    Alcohol use: Yes     Comment: Occasional    Drug use: No    Sexual activity: Not Currently     Partners: Female   Other Topics Concern    Caffeine Concern No    Grew up on a farm No    History of tanning No    Outdoor occupation No    Reaction to local anesthetic No    Pt has a pacemaker No    Pt has a defibrillator No       Family History   Problem Relation Age of Onset    Melanoma Other         malignant melanoma, Relative?       Past Medical History:     Dyslipidemia    Essential hypertension    GERD (gastroesophageal reflux disease)    Hx of adenomatous colonic polyps       Past Surgical History:   Procedure Laterality Date    Colonoscopy N/A 02/01/2022    Procedure: COLONOSCOPY;  Surgeon: India Cristobal MD;  Location: [facility] ENDOSCOPY    Hand/finger surgery unlisted Right 1982    hand surgery. R hand tendon injury    Laparoscopic cholecystectomy  03/21/2021         REVIEW OF SYSTEMS    System Neg/Pos Details   Constitutional Negative Fatigue, fever and weight loss.   ENMT Negative Drooling.   Eyes Negative Blurred vision and vision changes.   Respiratory Negative Dyspnea and wheezing.   Cardio Negative Chest pain, irregular heartbeat/palpitations and syncope.   GI Negative Abdominal pain and diarrhea.   Endocrine Negative Cold intolerance and heat intolerance.   Neuro Negative Tremors.   Psych Negative Anxiety and depression.   Integumentary Negative Frequent skin infections, pigment change and rash.   Hema/Lymph Negative Easy bleeding and easy bruising.           PHYSICAL EXAM    Wt 285 lb (129.3 kg)   BMI 46.00 kg/m²        Constitutional Normal Overall appearance - Normal.   Psychiatric Normal Orientation - Oriented to time, place, person & situation. Appropriate mood and affect.   Neck Exam Normal Inspection - Normal. Palpation - Normal. Parotid gland - Normal. Thyroid gland - Normal.   Eyes Normal Conjunctiva - Right: Normal, Left: Normal. Pupil - Right: Normal, Left: Normal. Fundus - Right: Normal, Left: Normal.   Neurological Normal Memory - Normal. Cranial nerves - Cranial nerves II through XII grossly intact.   Head/Face Normal Facial features - Normal. Eyebrows - Normal. Skull - Normal.        Nasopharynx Normal External nose - Normal. Lips/teeth/gums - Normal. Tonsils - Normal. Oropharynx - Normal.   Ears Normal Inspection - Right: Normal, Left: Normal. Canal - Right: Normal, Left: Normal. TM - Right: Normal, Left: Normal.   Skin Normal Inspection -  Normal.        Lymph Detail Normal Submental. Submandibular. Anterior cervical. Posterior cervical. Supraclavicular.        Nose/Mouth/Throat Normal External nose - Normal. Lips/teeth/gums - Normal. Tonsils - Normal. Oropharynx - Normal.  Tongue base obstructing the oropharyngeal airway.   Nose/Mouth/Throat Normal Nares - Right: Normal Left: Normal. Septum -very mild deviation turbinates - Right: Normal, Left: Normal.       Current Outpatient Medications:     amLODIPine 10 MG Oral Tab, Take 1 tablet (10 mg total) by mouth daily., Disp: 90 tablet, Rfl: 0    fenofibrate 145 MG Oral Tab, Take 1 tablet (145 mg total) by mouth daily., Disp: 90 tablet, Rfl: 3  ASSESSMENT AND PLAN    1. Hearing loss, unspecified hearing loss type, unspecified laterality  - Audiology Referral - [facility])    2. KARL (obstructive sleep apnea)  Significant snoring.  Occasional witnessed episodes of apnea.  He does occasionally have moments of fatigue but states that this may be more related to waking up earlier than normal.  Based on his symptoms I did recommend that he undergo home sleep study to rule out obstructive sleep apnea as he does have significant oropharyngeal obstruction with a very large tongue base.  We did review his hearing test which demonstrates essentially normal hearing at all frequencies except for 6008 1000 Hz with moderate loss on the right and mild loss on the left at those frequencies.  Normal tympanograms and speech discrimination scores.  I did discuss amplification and he will contact his insurance to see what type of benefits are available to him and decide if he wishes to pursue amplification.  Repeat audiogram in 1 year.  - Home Sleep Apnea Test (Adult pt only) - Sleep consult required for Medicare pts  - General sleep study; Future        This note was prepared using Dragon Medical voice recognition dictation software. As a result errors may occur. When identified these errors have been  corrected. While every attempt is made to correct errors during dictation discrepancies may still exist    Brando Zavaleta MD    11/29/2024    1:24 PM

## 2024-12-20 NOTE — TELEPHONE ENCOUNTER
Dr. Alexis, please kindly review; protocol failed  Please advise multiple attempts have been made since 9/2024 for patient to schedule a blood pressure follow up appointment.    A [employer] message was sent to patient to schedule a routine follow up blood pressure appointment. Noted the [facility] of Dr. Alexis. Patient may not be able to get in by the end of this year.     Routed to Call Center to call patient and make an appointment and to establish care with a new primary care provider.    No future appointments.  Last office visit: 7/16/2024    Requested Prescriptions   Pending Prescriptions Disp Refills    AMLODIPINE 10 MG Oral Tab [Pharmacy Med Name: AMLODIPINE BESYLATE 10 MG TAB] 90 tablet 0     Sig: TAKE 1 TABLET BY MOUTH EVERY DAY       Hypertension Medications Protocol Failed - 12/20/2024  3:53 PM        Failed - Last BP reading less than 140/90     BP Readings from Last 1 Encounters:   07/16/24 144/68               Passed - CMP or BMP in past 12 months        Passed - In person appointment or virtual visit in the past 12 mos or appointment in next 3 mos     Recent Outpatient Visits              3 weeks ago Asymmetric SNHL (sensorineural hearing loss)    [facility]    Office Visit    3 weeks ago Hearing loss, unspecified hearing loss type, unspecified laterality    [facility]    Office Visit    5 months ago [facility]ential [facility]    Office Visit    9 months ago Annual physical exam    [facility]    Office Visit    1 year ago Acne vulgaris    [facility]    [facility] Visit                      Passed - EGFRCR or GFRNAA > 50     GFR Evaluation  EGFRCR: 94 , resulted on 3/28/2024                  Recent Outpatient Visits              3 weeks ago Asymmetric SNHL (sensorineural hearing loss)    [facility] [place]    Office Visit    3 weeks ago Hearing loss, unspecified hearing loss type, unspecified laterality    [facility], Brando Kerr MD    Office Visit    5 months ago Essential hypertension    [facility]    Office Visit    9 months ago Annual physical exam    [facility], Mayank Guaman MD    Office Visit    1 year ago Acne vulgaris    [facility], Angela Gomez MD    Office Visit

## 2024-12-20 NOTE — TELEPHONE ENCOUNTER
Please call patient to assist in making an appointment. Thank you     **Patient was advised to have a blood pressure follow up appointment in September. Patient never scheduled.     **Due to Dr. Murdock retiring, patient may not be able to get in with him by the end of the year.      **Patient will need to make an appointment to establish care with a new primary care provider.          Last office visit: 7/16/2024    A [employer] message has been sent to patient     **Please attempt all available phone numbers in patient's chart. This includes alternative numbers and contacts on patient's release of information. Thank you.

## 2024-12-21 RX ORDER — AMLODIPINE BESYLATE 10 MG/1
10 TABLET ORAL DAILY
Qty: 30 TABLET | Refills: 0 | Status: SHIPPED | OUTPATIENT
Start: 2024-12-21

## 2025-01-15 NOTE — TELEPHONE ENCOUNTER
Current Outpatient Medications:     amLODIPine 10 MG Oral Tab, Take 1 tablet (10 mg total) by mouth daily. 30 day refill, Overdue for a routine blood pressure follow up appointment. Office visit required for further refills., Disp: 30 tablet, Rfl: 0

## 2025-01-20 RX ORDER — AMLODIPINE BESYLATE 10 MG/1
10 TABLET ORAL DAILY
Qty: 30 TABLET | Refills: 0 | Status: SHIPPED | OUTPATIENT
Start: 2025-01-20 | End: 2025-02-24

## 2025-01-20 NOTE — TELEPHONE ENCOUNTER
Please call patient to make an appointment.  Thank you     Patient was seeing Dr. Alexis. Primary Care Provider is marked as Dr. Macdonald, but patient hasn't seen Dr. Macdonald since 4/2021

## 2025-01-20 NOTE — TELEPHONE ENCOUNTER
M[employer] message sent to patient to schedule an office visit with PCP.   Please make a phone attempt.

## 2025-01-20 NOTE — TELEPHONE ENCOUNTER
Please review.  Protocol failed / Has no protocol.     Was patient of Dr. Alexis    Requested Prescriptions   Pending Prescriptions Disp Refills    amLODIPine 10 MG Oral Tab 90 tablet 0     Sig: Take 1 tablet (10 mg total) by mouth daily.   **Appointment needed for further refills.        Hypertension Medications Protocol Failed - 1/20/2025 11:12 AM        Failed - Last BP reading less than 140/90     BP Readings from Last 1 Encounters:   07/16/24 144/68               Passed - CMP or BMP in past 12 months        Passed - In person appointment or virtual visit in the past 12 mos or appointment in next 3 mos     Recent Outpatient Visits              1 month ago Asymmetric SNHL (sensorineural hearing loss)    [facility] [place] Sachi Hays [facility]    Office Visit    1 month ago Hearing loss, unspecified hearing loss type, unspecified laterality    [facility] [place]    Office Visit    6 months ago Essential hypertension    [facility]    Office Visit    10 months ago Annual physical exam    [facility]    Office Visit    1 year ago Acne vulgaris    [facility]    [facility] Visit          Future Appointments         Provider Department Appt Notes    In 1 month [facility]                     Passed - EGFRCR or GFRNAA > 50     GFR Evaluation  EGFRCR: 94 , resulted on 3/28/2024          Passed - Medication is active on med list           Future Appointments         Provider Department Appt Notes    In 1 month [facility]           Recent Outpatient Visits              1 month ago Asymmetric SNHL (sensorineural hearing loss)    [facility],  Sachi Pearson, YOLANDA    Office Visit    1 month ago Hearing loss, unspecified hearing loss type, unspecified laterality    [facility], Brando Kerr MD    Office Visit    6 months ago Essential hypertension    [email or web address] Mayank Guaman MD    Office Visit    10 months ago Annual physical exam    [email or web address] Mayank Guaman MD    Office Visit    1 year ago Acne vulgaris    [facility], [email or web address] Angela Gomez MD    Office Visit

## 2025-02-18 ENCOUNTER — TELEPHONE (OUTPATIENT)
Dept: INTERNAL MEDICINE CLINIC | Facility: CLINIC | Age: 60
End: 2025-02-18

## 2025-02-19 ENCOUNTER — OFFICE VISIT (OUTPATIENT)
Dept: SLEEP CENTER | Age: 60
End: 2025-02-19
Attending: OTOLARYNGOLOGY
Payer: COMMERCIAL

## 2025-02-19 DIAGNOSIS — G47.33 OSA (OBSTRUCTIVE SLEEP APNEA): ICD-10-CM

## 2025-02-19 PROCEDURE — 95806 SLEEP STUDY UNATT&RESP EFFT: CPT

## 2025-02-21 NOTE — PROCEDURES
[facility]  Accredited by the American Academy of Sleep Medicine (AASM)    PATIENT'S NAME: RADHA TORRES   ATTENDING PHYSICIAN: Johny Macdonald MD   REFERRING PHYSICIAN: Brando Zavaleta MD   PATIENT ACCOUNT #: [id number] LOCATION: Sleep Center   MEDICAL RECORD #: [id number] YOB: 1965   DATE OF STUDY: 02/19/2025       SLEEP STUDY REPORT    STUDY TYPE:  Home sleep test.    ORDERING PROVIDER:  Brando Zavaleta MD.    INDICATION:  Suspected obstructive sleep apnea (ICD-10 code G47.33) in patient with snoring, witnessed apneic events, body mass index 44.7, and an [place] Sleepiness Scale score of 0/24.    RESULTS:  The patient underwent home sleep test with measurement of his nasal air flow, nasal air pressure, snoring, chest and abdominal wall motion, oximetry, and body position.  I have reviewed the entirety of the raw data of this study.  During this study, the total recording time is 485 minutes.  The lights-out clock time is 10:20 p.m., lights-on clock time is 6:25 a.m.  The apnea plus hypopnea index is 56 events per hour.  The supine apnea plus hypopnea index is 69.8 events per hour.  The average oxygen saturation is 90%, the lowest oxygen saturation is 64%, and the patient spent 17.6% of the test with saturations 88% or less.  The average heart rate is 61 beats per minute, and the patient spent approximately 10% of the test in the supine position.    INTERPRETATION:  The data generated from this study is consistent with very severe obstructive sleep apnea (ICD-10 code G47.33).    RECOMMENDATIONS:    1.   CPAP titration.  2.   Weight loss.  3.   Avoid alcohol.  4.   Avoid sedating drug.  5.   Patient should not drive if at all sleepy.    Please do not hesitate to contact me if there is any question whatsoever regarding interpretation of this study.    Dictated By Uri Tse MD  d: 02/21/2025 00:13:26  t: 02/21/2025  00:20:17  [facility] [id number]  [facility]/    cc: MD Johny Centeno MD

## 2025-02-24 RX ORDER — AMLODIPINE BESYLATE 10 MG/1
10 TABLET ORAL DAILY
Qty: 15 TABLET | Refills: 0 | Status: SHIPPED | OUTPATIENT
Start: 2025-02-24

## 2025-02-24 NOTE — TELEPHONE ENCOUNTER
Please call patient to make an appointment with new establish care provider and [employer] message sent,thank you.

## 2025-02-24 NOTE — TELEPHONE ENCOUNTER
Please review; protocol failed/ has no protocol      Message sent for patient to make an appointment.     Former patient of Mayank Jolley   Courtesy fill sent however patient has not made an appointment

## 2025-03-14 RX ORDER — AMLODIPINE BESYLATE 10 MG/1
10 TABLET ORAL DAILY
Qty: 30 TABLET | Refills: 0 | Status: SHIPPED | OUTPATIENT
Start: 2025-03-14

## 2025-03-14 NOTE — TELEPHONE ENCOUNTER
Patient called requesting a refill on the following medication:      amLODIPine 10 MG Oral Tab     Per patient he will be out of medication tomorrow. He is scheduled for an appointment with  on 03/19/2025. Per patient send refill to the following pharmacy:    [facility]    [address] [place]

## 2025-03-14 NOTE — TELEPHONE ENCOUNTER
Please review.  Protocol failed / Has no protocol.   Marked High Priority, patient states out of medication    Asking for refill to get to appointment date:  Future Appointments   Date Time Provider Department Center   3/19/2025  9:00 AM Radha Norris MD [facility]        Requested Prescriptions   Pending Prescriptions Disp Refills    amLODIPine 10 MG Oral Tab 30 tablet 0     Sig: Take 1 tablet (10 mg total) by mouth daily.       Hypertension Medications Protocol Failed - 3/14/2025 10:56 AM        Failed - Last BP reading less than 140/90        Passed - CMP or BMP in past 12 months        Passed - In person appointment or virtual visit in the past 12 mos or appointment in next 3 mos        Passed - EGFRCR or GFRNAA > 50        Passed - Medication is active on med list

## 2025-03-18 NOTE — TELEPHONE ENCOUNTER
Please review; protocol failed/ has no protocol      Please see patients MyChart Message    Hector ALONZO [facility] (supporting Radha Norris MD)4 days ago       Refills have been requested for the following medications:         amLODIPine 10 MG Oral Tab [Radha Norris]      Patient Comment: Insurance requests 90 days       Please see message below for upcoming appointment.    Future Appointments   Date Time Provider Department Center   3/19/2025  9:00 AM Radha Norris MD [facility]

## 2025-03-19 RX ORDER — AMLODIPINE BESYLATE 10 MG/1
10 TABLET ORAL DAILY
Qty: 90 TABLET | Refills: 0 | Status: SHIPPED | OUTPATIENT
Start: 2025-03-19

## 2025-03-19 NOTE — TELEPHONE ENCOUNTER
Called patient to inform him that Dr Norris is out of the office today patient was in the clinic parking lot as we spoke     Patient was former patient with Dr Alexis    He needs his refill amLODIPine 10 MG Oral Tab   PER INSURANCE WILL NEED 90 DAY REFILL     Patient unable to come in later today because he travels for his job

## 2025-03-19 NOTE — TELEPHONE ENCOUNTER
Routing to pod mate/alternate provider due to High Priority status and Dr. Norris is out of office.    Please kindly review this medication  Medication request is marked high priority: patient's insurance requires a 90 day supply    [x] FAILS PROTOCOL            [] Controlled Substance             [] Medication not previously prescribed by Provider            [] Due for appointment- no future appointment scheduled            [x] BP reading            [] Labs            [] Depression Screening            [] Asthma (ACT recorded/ACT score)    [] HAS NO PROTOCOL ATTACHED     Previous patient of Dr. Alexis (retired) - are we able to provide this patient with a 90 day courtesy refill?    No courtesy fill has been provided for this medication.    Dr. Norris signed for #30 5 days ago.

## 2025-04-01 ENCOUNTER — OFFICE VISIT (OUTPATIENT)
Dept: OTOLARYNGOLOGY | Facility: CLINIC | Age: 60
End: 2025-04-01

## 2025-04-01 DIAGNOSIS — G47.30 SLEEP APNEA, UNSPECIFIED TYPE: Primary | ICD-10-CM

## 2025-04-01 DIAGNOSIS — G47.33 OSA (OBSTRUCTIVE SLEEP APNEA): ICD-10-CM

## 2025-04-01 PROCEDURE — 99213 OFFICE O/P EST LOW 20 MIN: CPT | Performed by: OTOLARYNGOLOGY

## 2025-04-01 NOTE — PROGRESS NOTES
Hector Morin is a 59 year old male.    Chief Complaint   Patient presents with    Follow - Up     Sleep study results        HISTORY OF PRESENT ILLNESS  She presents with 2 complaints.  Sent by his wife due to the severity of his snoring.  She still sleeps in the same room with him but does state that she has noted moments where he appears to stop breathing.  She also notes that he has significant difficulty hearing when there is any noise present.  He denies any significant hearing loss but does state that there are times that he is in a crowd or in a loud environment where he does have some difficulty hearing.  Based on his complaints of hearing loss audiogram was performed today based on his symptoms and complaints with finding of normal hearing at the lowest and mid frequencies see moderate loss on the right and a mild loss on the left at about 8000 Hz.  Normal temp and speech discrimination scores.  Does complain of dry mouth when sleeping states that his sleep is reasonably good in general but does note that he can be fit early in the mornings.     4/1/25 here to go over the results of his home sleep study.  Sleep study demonstrates apnea-hypopnea index of 56 which worsens to 69 in the supine position.  Low SaO2 is 64% with 17% of time spent beneath 88% SaO2.  Here to discuss management options.  Patient noted to have a very mild deviation of septum to the left and a very large tongue base obstructing his oropharyngeal airway      Social History     Socioeconomic History    Marital status:    Tobacco Use    Smoking status: Never    Smokeless tobacco: Never   Vaping Use    Vaping status: Never Used   Substance and Sexual Activity    Alcohol use: Yes     Comment: Occasional    Drug use: No    Sexual activity: Not Currently     Partners: Female   Other Topics Concern    Caffeine Concern No    Grew up on a farm No    History of tanning No    Outdoor occupation No    Reaction to local anesthetic No     Pt has a pacemaker No    Pt has a defibrillator No       Family History   Problem Relation Age of Onset    Melanoma Other         malignant melanoma, Relative?       Past Medical History:    Dyslipidemia    Essential hypertension    GERD (gastroesophageal reflux disease)    Hx of adenomatous colonic polyps       Past Surgical History:   Procedure Laterality Date    Colonoscopy N/A 02/01/2022    Procedure: COLONOSCOPY;  Surgeon: India Cristobal MD;  Location: [facility] ENDOSCOPY    Hand/finger surgery unlisted Right 1982    hand surgery. R hand tendon injury    Laparoscopic cholecystectomy  03/21/2021         REVIEW OF SYSTEMS    System Neg/Pos Details   Constitutional Negative Fatigue, fever and weight loss.   ENMT Negative Drooling.   Eyes Negative Blurred vision and vision changes.   Respiratory Negative Dyspnea and wheezing.   Cardio Negative Chest pain, irregular heartbeat/palpitations and syncope.   GI Negative Abdominal pain and diarrhea.   Endocrine Negative Cold intolerance and heat intolerance.   Neuro Negative Tremors.   Psych Negative Anxiety and depression.   Integumentary Negative Frequent skin infections, pigment change and rash.   Hema/Lymph Negative Easy bleeding and easy bruising.           PHYSICAL EXAM    There were no vitals taken for this visit.       Constitutional Normal Overall appearance - Normal.   Psychiatric Normal Orientation - Oriented to time, place, person & situation. Appropriate mood and affect.   Neck Exam Normal Inspection - Normal. Palpation - Normal. Parotid gland - Normal. Thyroid gland - Normal.   Eyes Normal Conjunctiva - Right: Normal, Left: Normal. Pupil - Right: Normal, Left: Normal. Fundus - Right: Normal, Left: Normal.   Neurological Normal Memory - Normal. Cranial nerves - Cranial nerves II through XII grossly intact.   Head/Face Normal Facial features - Normal. Eyebrows - Normal. Skull - Normal.        Nasopharynx Normal External nose - Normal. Lips/teeth/gums - Normal.  Tonsils - Normal. Oropharynx - Normal.   Ears Normal Inspection - Right: Normal, Left: Normal. Canal - Right: Normal, Left: Normal. TM - Right: Normal, Left: Normal.   Skin Normal Inspection - Normal.        Lymph Detail Normal Submental. Submandibular. Anterior cervical. Posterior cervical. Supraclavicular.        Nose/Mouth/Throat Normal External nose - Normal. Lips/teeth/gums - Normal. Tonsils - Normal. Oropharynx - Normal.  Large tongue base obstructing the oropharyngeal airway   Nose/Mouth/Throat Normal Nares - Right: Normal Left: Normal. Septum -Mild deviationTurbinates - Right: Normal, Left: Normal.       Current Outpatient Medications:     amLODIPine 10 MG Oral Tab, Take 1 tablet (10 mg total) by mouth daily. 90 day refill only, appointment to establish care is needed for any further refills., Disp: 90 tablet, Rfl: 0    fenofibrate 145 MG Oral Tab, Take 1 tablet (145 mg total) by mouth daily., Disp: 90 tablet, Rfl: 3  ASSESSMENT AND PLAN    1. Sleep apnea, unspecified type    - DIAGOSTIC SLEEP STUDY  - General sleep study; Future    2. KARL (obstructive sleep apnea)  Very severe obstructive sleep apnea.  We did discuss that surgery would be unlikely to cure him of his sleep apnea therefore I did recommend that he undergo a formal sleep study in the sleep lab as he will most likely require CPAP.  We will contact him with the results of his study and we will proceed with ordering a CPAP machine once the results are back.        This note was prepared using Dragon Medical voice recognition dictation software. As a result errors may occur. When identified these errors have been corrected. While every attempt is made to correct errors during dictation discrepancies may still exist    Brando Zavaleta MD    4/1/2025    12:33 PM

## 2025-04-04 ENCOUNTER — TELEPHONE (OUTPATIENT)
Dept: SLEEP CENTER | Age: 60
End: 2025-04-04

## 2025-04-04 ENCOUNTER — TELEPHONE (OUTPATIENT)
Dept: OTOLARYNGOLOGY | Facility: CLINIC | Age: 60
End: 2025-04-04

## 2025-04-04 DIAGNOSIS — G47.33 OSA (OBSTRUCTIVE SLEEP APNEA): Primary | ICD-10-CM

## 2025-04-04 NOTE — TELEPHONE ENCOUNTER
Per Dr. Zavaleta, Patient had Sleep study reordered by staff . This should be cancelled and CPAP titration in facility should be ordered. Has very severe KARL so needs CPAP titration in a facility.   Order generated, JOHN, edouard sent to schedule.

## 2025-05-05 ENCOUNTER — TELEPHONE (OUTPATIENT)
Facility: CLINIC | Age: 60
End: 2025-05-05

## 2025-06-17 RX ORDER — AMLODIPINE BESYLATE 10 MG/1
10 TABLET ORAL DAILY
Qty: 30 TABLET | Refills: 0 | Status: SHIPPED | OUTPATIENT
Start: 2025-06-17

## 2025-06-17 NOTE — TELEPHONE ENCOUNTER
Please review: medication fails/has no protocol attached.    Previous patient of Dr. Alexis (retired)    Future Appointments   Date Time Provider Department Center   7/1/2025  9:00 AM Radha Norris MD [facility]     Last office visit: 7/16/24    No active/future labs pended - last completed - 3/28/24

## 2025-06-24 ENCOUNTER — OFFICE VISIT (OUTPATIENT)
Dept: SLEEP CENTER | Age: 60
End: 2025-06-24
Attending: OTOLARYNGOLOGY
Payer: COMMERCIAL

## 2025-06-24 DIAGNOSIS — G47.33 OSA (OBSTRUCTIVE SLEEP APNEA): Primary | ICD-10-CM

## 2025-06-24 PROCEDURE — 95811 POLYSOM 6/>YRS CPAP 4/> PARM: CPT

## 2025-07-01 ENCOUNTER — OFFICE VISIT (OUTPATIENT)
Dept: INTERNAL MEDICINE CLINIC | Facility: CLINIC | Age: 60
End: 2025-07-01

## 2025-07-01 VITALS
WEIGHT: 286.19 LBS | OXYGEN SATURATION: 95 % | SYSTOLIC BLOOD PRESSURE: 146 MMHG | DIASTOLIC BLOOD PRESSURE: 82 MMHG | BODY MASS INDEX: 45.99 KG/M2 | TEMPERATURE: 98 F | HEART RATE: 84 BPM | HEIGHT: 66 IN

## 2025-07-01 DIAGNOSIS — E78.5 DYSLIPIDEMIA: ICD-10-CM

## 2025-07-01 DIAGNOSIS — R73.03 PREDIABETES: ICD-10-CM

## 2025-07-01 DIAGNOSIS — Z23 NEED FOR VACCINATION: ICD-10-CM

## 2025-07-01 DIAGNOSIS — R74.01 ELEVATED AST (SGOT): ICD-10-CM

## 2025-07-01 DIAGNOSIS — Z00.00 ENCOUNTER FOR GENERAL ADULT MEDICAL EXAMINATION WITHOUT ABNORMAL FINDINGS: Primary | ICD-10-CM

## 2025-07-01 DIAGNOSIS — G47.33 OSA (OBSTRUCTIVE SLEEP APNEA): ICD-10-CM

## 2025-07-01 DIAGNOSIS — Z12.5 ENCOUNTER FOR PROSTATE CANCER SCREENING: ICD-10-CM

## 2025-07-01 DIAGNOSIS — Z86.0101 HX OF ADENOMATOUS COLONIC POLYPS: ICD-10-CM

## 2025-07-01 DIAGNOSIS — I10 ESSENTIAL HYPERTENSION: ICD-10-CM

## 2025-07-01 DIAGNOSIS — R74.01 ELEVATED ALT MEASUREMENT: ICD-10-CM

## 2025-07-01 DIAGNOSIS — Z12.11 ENCOUNTER FOR SCREENING COLONOSCOPY: ICD-10-CM

## 2025-07-01 RX ORDER — FENOFIBRATE 145 MG/1
145 TABLET, FILM COATED ORAL DAILY
Qty: 90 TABLET | Refills: 3 | Status: SHIPPED | OUTPATIENT
Start: 2025-07-01

## 2025-07-01 RX ORDER — AMLODIPINE BESYLATE 10 MG/1
10 TABLET ORAL DAILY
Qty: 90 TABLET | Refills: 3 | Status: SHIPPED | OUTPATIENT
Start: 2025-07-01

## 2025-07-01 NOTE — ASSESSMENT & PLAN NOTE
Continue fenofibrate.  Recheck cholesterol panel.  Calculate ASCVD risk score, and then can consider a CT calcium scan to further risk stratify the patient consider starting a statin for primary prevention and reduction in LDL.  Orders:    fenofibrate 145 MG Oral Tab; Take 1 tablet (145 mg total) by mouth daily.    CBC With Differential With Platelet; Future    Comp Metabolic Panel (14); Future    Lipid Panel; Future    TSH W Reflex To Free T4; Future    Hemoglobin A1C; Future    Vitamin D; Future

## 2025-07-01 NOTE — PROGRESS NOTES
The following individual(s) verbally consented to be recorded using ambient AI listening technology and understand that they can each withdraw their consent to this listening technology at any point by asking the clinician to turn off or pause the recording:     Patient name: Hector Morin  Additional names: None   [employer] Pregnancy And Lactation Text: This medication is Pregnancy Category X and contraindicated in pregnancy and in women who may become pregnant. It is unknown if this medication is excreted in breast milk.

## 2025-07-01 NOTE — PROGRESS NOTES
REASON FOR VISIT      Hector Morin is a 60 year old male who presents for  Annual Physical Exam (not Medicare, or ABN signed for Medicare non covered service) and scheduled follow up of multiple significant but stable problems.     HCM   - colon: Next due 2/1/2027  - PSA: Needs, ordered today  - labs: Needs, ordered today  - imm: Flu shot? COVID booster? Shingles? PNA?  Needs flu shot in the fall, needs other COVID-vaccine, needs pneumonia, needs tetanus, needs RSV, shingles up-to-date  - depression: neg     Patient has a history of sleep apnea. He follows with Dr. Zavaleta 4/1/2025. He had a home sleep study that demonstrated very severe KARL with apnea-hypopnea index of 56 which worsens to 69 in the supine position. Low SaO2 is 64% with 17% of time spent beneath 88% SaO2. He was recommended to undergo a CPAP titration study, was done 6/24/25. He is awaiting further communication.     HTN.  Pressure today 146/82.  Current medications include amlodipine 10 mg daily. Ran out of his medications yesterday, so has not taken any amlodipine today. His BP at home (usually checked at [facility] etc) is 130s/80s.     Patient has a history of hyperlipidemia.  He was recommended at 1 point to be on fenofibrate.  Last cholesterol panel was done 3/28/2024, notable for an LDL of 141.    Patient has a history of prediabetes, with the last A1c done 3/28/2024 5.9.    Does have an elevation in his transaminases, last done 3/28/2024, with ALT/AST 71/53.  Bilirubin normal.        Past Medical History     Past Medical History[1]     Past Surgical History     Past Surgical History[2]     Family History     Family History[3]    Social History     Short Social Hx on File[4]    Tobacco:   She currently has tobacco smoking, smokeless, or e-cigarette status listed as never assessed or unknown.    Please update the information in the Tobacco history section to reflect the true status, and refresh this smartlink.    CAGE Alcohol Screen:    Cage screening not needed because reported NO to Alcohol use on social history.    Depression Screening (PHQ):  PHQ-2/9 not done in last week, please ask questions. Last done              DONOVAN-7 Total Score                 Allergies     Allergies[5]    Current Medications     Current Outpatient Medications   Medication Sig Dispense Refill    amLODIPine 10 MG Oral Tab Take 1 tablet (10 mg total) by mouth daily. **Please address all refills at your upcoming appointment** 30 tablet 0    fenofibrate 145 MG Oral Tab Take 1 tablet (145 mg total) by mouth daily. (Patient not taking: Reported on 7/1/2025) 90 tablet 3     No current facility-administered medications for this visit.       Screenings     History/Other:   Fall Risk Assessment:    (Incomplete)        Cognitive Assessment:    (Incomplete)  Tip  Cognitive assessment incomplete. Refresh to ensure screening pulls in; if not,click link above to assess, then refresh:[id number]}      Functional Ability/Status:    (Incomplete)      Immunization History   Administered Date(s) Administered    Covid-19 Vaccine Pfizer 30 mcg/0.3 ml 02/26/2021, 03/29/2021, 10/03/2021    Covid-19 Vaccine Pfizer Bivalent 30mcg/0.3mL 09/18/2022    Covid-19 Vaccine Pfizer Richard-Sucrose 30 mcg/0.3 ml 04/04/2022    FLUZONE 6 months and older PFS 0.5 ml (81219) 09/01/2020, 08/31/2023    Flucelvax Influenza vaccine, trivalent (ccIIV3), 0.5mL IM 10/03/2021, 09/18/2022    Influenza 09/18/2022    Influenza(Afluria)0.5ml QIV PFS 09/01/2020    Pfizer Covid-19 Vaccine 30mcg/0.3ml 12yrs+ 10/11/2023    Pneumococcal Conjugate PCV20 07/01/2025    TDAP 07/01/2025    Zoster Vaccine Recombinant Adjuvanted (Shingrix) 04/08/2023, 08/31/2023       Health Maintenance   Topic Date Due    DTaP,Tdap,and Td Vaccines (1 - Tdap) Never done    Pneumococcal Vaccine: 50+ Years (1 of 1 - PCV) Never done    COVID-19 Vaccine (7 - 2024-25 season) 09/01/2024    Annual Physical  03/25/2025    HTN: BP Follow-Up  08/01/2025    Influenza  Vaccine (1) 10/01/2025    PSA  03/28/2026    Colorectal Cancer Screening  02/01/2027    Annual Depression Screening  Completed    Zoster Vaccines  Completed    Meningococcal B Vaccine  Aged Out         Review of Systems     GENERAL HEALTH: feels well otherwise  SKIN: denies any unusual skin lesions or rashes  RESPIRATORY: denies shortness of breath with exertion  CARDIOVASCULAR: denies chest pain on exertion  GI: denies abdominal pain and denies heartburn  : denies any burning with urination, urinary frequency or urgency  NEURO: denies headaches, numbness or tingling, mental status changes  PSYCH: denies depressed mood, anxiety  MUSC: denies muscle aches, joint pain      Physical Exam     /82   Pulse 84   Temp 97.9 °F (36.6 °C) (Temporal)   Ht 5' 6\" (1.676 m)   Wt 286 lb 3.2 oz (129.8 kg)   SpO2 95%   BMI 46.19 kg/m²   >   BP Readings from Last 3 Encounters:   07/01/25 146/82   07/16/24 144/68   03/25/24 154/84       GENERAL: well developed, well nourished, in no apparent distress  SKIN: no rashes, no suspicious lesions  HEENT: atraumatic, normocephalic, ears and throat are clear                Hearing Assessed via: Finger Rub  EYES: PERRLA, EOMI, conjunctiva are clear    CHEST: no chest tenderness  LUNGS: clear to auscultation  CARDIO: RRR without murmur  GI: good BS's, no masses, HSM or tenderness  : deferred  RECTAL: deferred  MUSCULOSKELETAL: back is not tender, FROM of the back  EXTREMITIES: no cyanosis, clubbing or edema  NEURO: Oriented times three, cranial nerves are intact, motor and sensory are grossly intact  FOOT: deferred      Assessment and Plan     Hector Morin is a 60 year old male who presents for an Annual Health Assessment.     Assessment & Plan  Encounter for general adult medical examination without abnormal findings  Age appropriate screenings and vaccinations discussed. Counseled on healthy diet, exercise, sleep hygiene. Patient advised that any additional concerns  not included in a routine physical may result in additional charges and/or a copay. Patient verbally acknowledged this information and agreed to proceed.      Orders:    CBC With Differential With Platelet; Future    Comp Metabolic Panel (14); Future    Lipid Panel; Future    TSH W Reflex To Free T4; Future    Hemoglobin A1C; Future    Vitamin D; Future    Encounter for screening colonoscopy  Hx of adenomatous colonic polyps  Next due 2027         Encounter for prostate cancer screening  Ordered today.   Orders:    PSA Total, Screen; Future    Essential hypertension  Restart amlodipine 10 mg daily.  Recheck blood pressure in 6 months.  Patient recommended to continue tracking at home as possible, blood pressure goal <140/<90.  Orders:    amLODIPine 10 MG Oral Tab; Take 1 tablet (10 mg total) by mouth daily. **Please address all refills at your upcoming appointment**    Dyslipidemia  Continue fenofibrate.  Recheck cholesterol panel.  Calculate ASCVD risk score, and then can consider a CT calcium scan to further risk stratify the patient consider starting a statin for primary prevention and reduction in LDL.  Orders:    fenofibrate 145 MG Oral Tab; Take 1 tablet (145 mg total) by mouth daily.    CBC With Differential With Platelet; Future    Comp Metabolic Panel (14); Future    Lipid Panel; Future    TSH W Reflex To Free T4; Future    Hemoglobin A1C; Future    Vitamin D; Future    Elevated ALT measurement  Elevated AST (SGOT)  Recheck hepatic function panel.  Consider ultrasound of the liver.  Orders:    CBC With Differential With Platelet; Future    Comp Metabolic Panel (14); Future    Lipid Panel; Future    TSH W Reflex To Free T4; Future    Hemoglobin A1C; Future    Vitamin D; Future    Prediabetes  Recheck A1c.  Orders:    CBC With Differential With Platelet; Future    Comp Metabolic Panel (14); Future    Lipid Panel; Future    TSH W Reflex To Free T4; Future    Hemoglobin A1C; Future    Vitamin D; Future    Need  for vaccination  Tdap and Prevnar given today.  Needs RSV and flu shot, can consider getting in the fall  Orders:    TdaP (Boostrix/Adacel) Vaccine (> 7 Y)    PCV20 (Prevnar 20)    KARL (obstructive sleep apnea)  Await communication from ENT.  Will likely need CPAP machine.          The patient indicates understanding of these issues and agrees to the plan.  The patient is asked to return in 6 months for office visit  Diet counseling perfomed  Exercise counseling perfomed    Electronically signed by Radha Norris MD 07/01/25       [1]   Past Medical History:   Dyslipidemia    Essential hypertension    GERD (gastroesophageal reflux disease)    Hx of adenomatous colonic polyps   [2]   Past Surgical History:  Procedure Laterality Date    Colonoscopy N/A 02/01/2022    Procedure: COLONOSCOPY;  Surgeon: India Cristobal MD;  Location: [facility] ENDOSCOPY    Hand/finger surgery unlisted Right 1982    hand surgery. R hand tendon injury    Laparoscopic cholecystectomy  03/21/2021   [3]   Family History  Problem Relation Age of Onset    Melanoma Other         malignant melanoma, Relative?   [4]   Social History  Socioeconomic History    Marital status:    Tobacco Use    Smoking status: Never    Smokeless tobacco: Never   Vaping Use    Vaping status: Never Used   Substance and Sexual Activity    Alcohol use: Yes     Comment: Occasional    Drug use: No    Sexual activity: Not Currently     Partners: Female   Other Topics Concern    Caffeine Concern No    Grew up on a farm No    History of tanning No    Outdoor occupation No    Reaction to local anesthetic No    Pt has a pacemaker No    Pt has a defibrillator No   [5] No Known Allergies

## 2025-07-01 NOTE — ASSESSMENT & PLAN NOTE
Restart amlodipine 10 mg daily.  Recheck blood pressure in 6 months.  Patient recommended to continue tracking at home as possible, blood pressure goal <140/<90.  Orders:    amLODIPine 10 MG Oral Tab; Take 1 tablet (10 mg total) by mouth daily. **Please address all refills at your upcoming appointment**

## 2025-07-01 NOTE — PATIENT INSTRUCTIONS
No medication changes today.     Please have fasting labs done per your convenience.     Please follow up with me in 6 months.    Vaccines:  - pneumonia shot today   - RSV and flu shot in the fall

## 2025-07-02 ENCOUNTER — LAB ENCOUNTER (OUTPATIENT)
Dept: LAB | Facility: HOSPITAL | Age: 60
End: 2025-07-02
Attending: INTERNAL MEDICINE
Payer: COMMERCIAL

## 2025-07-02 DIAGNOSIS — E78.5 DYSLIPIDEMIA: ICD-10-CM

## 2025-07-02 DIAGNOSIS — R73.03 PREDIABETES: ICD-10-CM

## 2025-07-02 DIAGNOSIS — Z00.00 ENCOUNTER FOR GENERAL ADULT MEDICAL EXAMINATION WITHOUT ABNORMAL FINDINGS: ICD-10-CM

## 2025-07-02 DIAGNOSIS — R74.01 ELEVATED ALT MEASUREMENT: ICD-10-CM

## 2025-07-02 DIAGNOSIS — R74.01 ELEVATED AST (SGOT): ICD-10-CM

## 2025-07-02 DIAGNOSIS — Z12.5 ENCOUNTER FOR PROSTATE CANCER SCREENING: ICD-10-CM

## 2025-07-02 LAB
ALBUMIN SERPL-MCNC: 4.8 G/DL (ref 3.2–4.8)
ALBUMIN/GLOB SERPL: 2.2 {RATIO} (ref 1–2)
ALP LIVER SERPL-CCNC: 72 U/L (ref 45–117)
ALT SERPL-CCNC: 44 U/L (ref 10–49)
ANION GAP SERPL CALC-SCNC: 8 MMOL/L (ref 0–18)
AST SERPL-CCNC: 35 U/L (ref ?–34)
BASOPHILS # BLD AUTO: 0.05 X10(3) UL (ref 0–0.2)
BASOPHILS NFR BLD AUTO: 0.7 %
BILIRUB SERPL-MCNC: 0.6 MG/DL (ref 0.2–1.1)
BUN BLD-MCNC: 13 MG/DL (ref 9–23)
BUN/CREAT SERPL: 14.9 (ref 10–20)
CALCIUM BLD-MCNC: 9.2 MG/DL (ref 8.7–10.4)
CHLORIDE SERPL-SCNC: 104 MMOL/L (ref 98–112)
CHOLEST SERPL-MCNC: 210 MG/DL (ref ?–200)
CO2 SERPL-SCNC: 28 MMOL/L (ref 21–32)
COMPLEXED PSA SERPL-MCNC: 0.63 NG/ML (ref ?–4)
CREAT BLD-MCNC: 0.87 MG/DL (ref 0.7–1.3)
DEPRECATED RDW RBC AUTO: 40.8 FL (ref 35.1–46.3)
EGFRCR SERPLBLD CKD-EPI 2021: 99 ML/MIN/1.73M2 (ref 60–?)
EOSINOPHIL # BLD AUTO: 0.19 X10(3) UL (ref 0–0.7)
EOSINOPHIL NFR BLD AUTO: 2.7 %
ERYTHROCYTE [DISTWIDTH] IN BLOOD BY AUTOMATED COUNT: 12.3 % (ref 11–15)
EST. AVERAGE GLUCOSE BLD GHB EST-MCNC: 117 MG/DL (ref 68–126)
FASTING PATIENT LIPID ANSWER: YES
FASTING STATUS PATIENT QL REPORTED: YES
GLOBULIN PLAS-MCNC: 2.2 G/DL (ref 2–3.5)
GLUCOSE BLD-MCNC: 109 MG/DL (ref 70–99)
HBA1C MFR BLD: 5.7 % (ref ?–5.7)
HCT VFR BLD AUTO: 46.4 % (ref 39–53)
HDLC SERPL-MCNC: 40 MG/DL (ref 40–59)
HGB BLD-MCNC: 15.2 G/DL (ref 13–17.5)
IMM GRANULOCYTES # BLD AUTO: 0.03 X10(3) UL (ref 0–1)
IMM GRANULOCYTES NFR BLD: 0.4 %
LDLC SERPL CALC-MCNC: 145 MG/DL (ref ?–100)
LYMPHOCYTES # BLD AUTO: 1.37 X10(3) UL (ref 1–4)
LYMPHOCYTES NFR BLD AUTO: 19.6 %
MCH RBC QN AUTO: 29.5 PG (ref 26–34)
MCHC RBC AUTO-ENTMCNC: 32.8 G/DL (ref 31–37)
MCV RBC AUTO: 90.1 FL (ref 80–100)
MONOCYTES # BLD AUTO: 0.65 X10(3) UL (ref 0.1–1)
MONOCYTES NFR BLD AUTO: 9.3 %
NEUTROPHILS # BLD AUTO: 4.7 X10 (3) UL (ref 1.5–7.7)
NEUTROPHILS # BLD AUTO: 4.7 X10(3) UL (ref 1.5–7.7)
NEUTROPHILS NFR BLD AUTO: 67.3 %
NONHDLC SERPL-MCNC: 170 MG/DL (ref ?–130)
OSMOLALITY SERPL CALC.SUM OF ELEC: 291 MOSM/KG (ref 275–295)
PLATELET # BLD AUTO: 247 10(3)UL (ref 150–450)
POTASSIUM SERPL-SCNC: 4.1 MMOL/L (ref 3.5–5.1)
PROT SERPL-MCNC: 7 G/DL (ref 5.7–8.2)
RBC # BLD AUTO: 5.15 X10(6)UL (ref 4.3–5.7)
SODIUM SERPL-SCNC: 140 MMOL/L (ref 136–145)
TRIGL SERPL-MCNC: 136 MG/DL (ref 30–149)
TSI SER-ACNC: 3.08 UIU/ML (ref 0.55–4.78)
VIT D+METAB SERPL-MCNC: 31.3 NG/ML (ref 30–100)
VLDLC SERPL CALC-MCNC: 26 MG/DL (ref 0–30)
WBC # BLD AUTO: 7 X10(3) UL (ref 4–11)

## 2025-07-02 PROCEDURE — 82306 VITAMIN D 25 HYDROXY: CPT

## 2025-07-02 PROCEDURE — 80053 COMPREHEN METABOLIC PANEL: CPT

## 2025-07-02 PROCEDURE — 83036 HEMOGLOBIN GLYCOSYLATED A1C: CPT

## 2025-07-02 PROCEDURE — 36415 COLL VENOUS BLD VENIPUNCTURE: CPT

## 2025-07-02 PROCEDURE — 84443 ASSAY THYROID STIM HORMONE: CPT

## 2025-07-02 PROCEDURE — 80061 LIPID PANEL: CPT

## 2025-07-02 PROCEDURE — 85025 COMPLETE CBC W/AUTO DIFF WBC: CPT

## (undated) DEVICE — TROCAR: Brand: KII® SLEEVE

## (undated) DEVICE — CAUTERY BLADE 2IN INS E1455

## (undated) DEVICE — GAMMEX® PI HYBRID SIZE 7.5, STERILE POWDER-FREE SURGICAL GLOVE, POLYISOPRENE AND NEOPRENE BLEND: Brand: GAMMEX

## (undated) DEVICE — KIT CLEAN ENDOKIT 1.1OZ GOWNX2

## (undated) DEVICE — TROCAR: Brand: KII FIOS FIRST ENTRY

## (undated) DEVICE — MEDI-VAC NON-CONDUCTIVE SUCTION TUBING 6MM X 1.8M (6FT.) L: Brand: CARDINAL HEALTH

## (undated) DEVICE — DISPOSABLE GRASPER CARTRIDGE: Brand: DIRECT DRIVE REPOSABLE GRASPERS

## (undated) DEVICE — KIT ENDO ORCAPOD 160/180/190

## (undated) DEVICE — [HIGH FLOW INSUFFLATOR,  DO NOT USE IF PACKAGE IS DAMAGED,  KEEP DRY,  KEEP AWAY FROM SUNLIGHT,  PROTECT FROM HEAT AND RADIOACTIVE SOURCES.]: Brand: PNEUMOSURE

## (undated) DEVICE — TISSUE RETRIEVAL SYSTEM: Brand: INZII RETRIEVAL SYSTEM

## (undated) DEVICE — ABDOMINAL BINDER: Brand: DEROYAL

## (undated) DEVICE — SNARE ENDOSCOPIC 10MM ROUND

## (undated) DEVICE — FORCEP RADIAL JAW 4

## (undated) DEVICE — LINE MNTR ADLT SET O2 INTMD

## (undated) DEVICE — LIGAMAX 5 MM ENDOSCOPIC MULTIPLE CLIP APPLIER: Brand: LIGAMAX

## (undated) DEVICE — 6 ML SYRINGE LUER-LOCK TIP: Brand: MONOJECT

## (undated) DEVICE — TROCARS: Brand: KII® BALLOON BLUNT TIP SYSTEM

## (undated) DEVICE — UNDYED BRAIDED (POLYGLACTIN 910), SYNTHETIC ABSORBABLE SUTURE: Brand: COATED VICRYL

## (undated) DEVICE — LIGASURE LAP MARYLAND 37CM

## (undated) DEVICE — SOL H2O 1000ML BTL

## (undated) DEVICE — SNARE OPTMZ PLPCTM TRP

## (undated) DEVICE — HOVERMATT 34IN SINGLE USE

## (undated) DEVICE — 3 ML SYRINGE LUER-LOCK TIP: Brand: MONOJECT

## (undated) DEVICE — LAP CHOLE: Brand: MEDLINE INDUSTRIES, INC.

## (undated) DEVICE — DISPOSABLE SUCTION/IRRIGATOR TUBE SET: Brand: AHTO

## (undated) DEVICE — 35 ML SYRINGE REGULAR TIP: Brand: MONOJECT

## (undated) DEVICE — SOL  .9 1000ML BAG

## (undated) DEVICE — DERMABOND LIQUID ADHESIVE

## (undated) DEVICE — SUTURE VICRYL 0 UR-6

## (undated) NOTE — LETTER
8/14/2018              Boston Macdonald        [address] [place] [id number]         Dear Melvin Watts,    This letter is to inform you that our office has made several attempts to reach you by phone without success.   We were attempting to conta

## (undated) NOTE — LETTER
[address]      Authorization for Surgical Operation and Procedure     Date:__3/21/21_________                                                                                                         Time: the potential risks that can occur: fever and allergic reactions, hemolytic reactions, transmission of diseases such as Hepatitis, AIDS and Cytomegalovirus (CMV) and fluid overload.   In the event that I wish to have an autologous transfusion of my own bloo physician will determine when the applicable recovery period ends for purposes of reinstating the DNAR order.   10. Patients having a sterilization procedure: I understand that if the procedure is successful the results will be permanent and it will therefo _______________________________________________________________ _____________________________  Rosa ChavezDate)                                   (Time)

## (undated) NOTE — LETTER
[address], [place]  Authorization for Invasive Procedures  1. I hereby authorize Dr. Alfaro , my physician and whomever may be designated as the doctor's assistant, to perform the following operation and/or procedure:  COLONOSCOPY on Tori Amor at [facility].    2. My physician has explained to me the nature and purpose of the operation or other procedure, possible alternative methods of treatment, the risks involved and the possibility of complications to me. I understand the probable consequences of declining the recommended procedure and the alternative methods of treatment. I acknowledge that no guarantee has been made as to the result that may be obtained. 3. I recognize that during the course of this operation or other procedure, unforeseen conditions may necessitate additional or different procedures than those listed above. I, therefore, further authorize and request that the above-named physician, his/her physician assistants, or designees perform such procedures as are, in his/her professional opinion, necessary and desirable. If I have a Do Not Attempt Resuscitation (DNAR) order in place, that status will be suspended while in the operating room, procedural suite, and during the recovery period unless otherwise explicitly stated by me (or a person authorized to consent on my behalf). The surgeon or my attending physician will determine when the applicable recovery period ends for purposes of reinstating the DNAR order. 4. Should the need arise during my operation or immediate post-operative period; I also consent to the administration of blood and/or blood products.  Further, I understand that despite careful testing and screening of blood and blood products, I may still be subject to ill effects as a result of recieving a blood transfusion an/or blood producst. The following are some, but not all, of the potential risks that can occur: fever and allergic reactions, hemolytic reactions, transmission of disease such as hepatitis, AIDS, cytomegalovirus (CMV), and flluid overload. In the event that I wish to have autologous transfusions of my own blood, or a directed donor transfusion, I will discuss this with my physician. 5. I consent to the photographing of the operations or procedures to be performed for the purposes of advancing medicine, science, and/or education, provided my identity is not revealed. If the procedure has been videotaped, the physician/surgeon will obtain the original videotape. The hospital will not be responsible for storage or maintenance of this tape. 6. I consent to the presence of a  or observer as deemed necessary by my physician or his designee. 7. Any tissues or organs removed in the operation or other procedure may be disposed of by and at the discretion of [facility].    8. I understand that the physician and his/her physician assistants may not be employees or agents of [facility], [facility], [facility], but are independent medical practitioners who have been permitted to use its facilities for the care and treatment of their patients. 9. Patients having a sterilization procedure: I understand that if the procedure is successful the results will be permanent and it will therefore be impossible for me to inseminate, conceive or bear children. I also understand that the procedure is intended to result in sterility, although the result has not been guaranteed. 10. I CERTIFY THAT I HAVE READ AND FULLY UNDERSTAND THE ABOVE CONSENT TO OPERATION and/or OTHER PROCEDURE. 11. I acknowledge that my physician has explained sedation/analgesia administration to me including the risks and benefits. I consent to the administration of sedation/analgesia as may be necessary or desirable in the judgment of my physician.      Signature of Patient:  ________________________________________________ Date: _________Time: _________    Responsible person in case of minor or unconscious: _____________________________Relationship: ____________     Witness Signature: ____________________________________________ Date: __________ Time: ___________    Statement of Physician  My signature below affirms that prior to the time of the procedure, I have explained to the patient and/or his legal representative, the risks and benefits involved in the proposed treatment and any reasonable alternative to the proposed treatment. I have also explained the risks and benefits involved in the refusal of the proposed treatment and have answered the patient's questions. If I have a significant financial interest in this procedure/surgery, I have disclosed this and had a discussion with my patient.     Signature of Physician:   ________________________________________Date: _________Time:_______ Patient Name: Tori Amor  : 1965   Printed: 2022    Medical Record #: [id number]

## (undated) NOTE — LETTER
[facility]  Administration of Anesthesia  1. [facility] [place] _________________________________ acting on his behalf, (Patient) (Dependent/Representative) request to receive anesthesia for my pending procedure/operation/treatment. bleeding, seizure, cardiac arrest and death. 7. AWARENESS: I understand that it is possible (but unlikely) to have explicit memory of events from the operating room while under general anesthesia.   8. ELECTROCONVULSIVE THERAPY PATIENTS: This consent serve below affirms that prior to the time of the procedure, I have explained to the patient and/or his/her guardian, the risks and benefits of undergoing anesthesia, as well as any reasonable alternatives.     ___________________________________________________

## (undated) NOTE — LETTER
8/13/2020              [place]        [address]        [place] [id number]         Dear Cody Nunez,    This letter is to inform you that our office has made several attempts to reach you by phone without success.   We were attempting to conta